# Patient Record
Sex: FEMALE | Race: WHITE | NOT HISPANIC OR LATINO | URBAN - METROPOLITAN AREA
[De-identification: names, ages, dates, MRNs, and addresses within clinical notes are randomized per-mention and may not be internally consistent; named-entity substitution may affect disease eponyms.]

---

## 2016-12-22 NOTE — H&P PST ADULT - ASSESSMENT
88yo female patient scheduled for surgery on 1/6/16. She has been seen by Cardiology and Vascular and will be obtaining medical clearance as well. She will hold Advil starting on 12/30. She will be NPO as per Anesthesia and will take Levothyroxine, Pepcid, Alprazolam, Losartan and Verapamil on AM of surgery with a sip of water. All other pre-op instructions reviewed with patient.   She will meet with Anesthesia and Pharmacy today.   She denies any metal allergies.   Pts height and weight are estimated - unable to stand up.   Pts family history is unknown- adopted. 88yo female patient scheduled for surgery on 1/6/16. She has been seen by Cardiology and Vascular and will be obtaining medical clearance as well. She will hold Advil starting on 12/30. She will be NPO as per Anesthesia and will take Levothyroxine, Pepcid, Alprazolam, Losartan and Verapamil on AM of surgery with a sip of water. All other pre-op instructions reviewed with patient.   She will meet with Anesthesia and Pharmacy today.   She denies any metal allergies.   Pts height and weight are as reported (from assisted living) - unable to stand up.   Pts family history is unknown- adopted.

## 2016-12-22 NOTE — H&P PST ADULT - HISTORY OF PRESENT ILLNESS
86yo female patient with approximately 5yr history of progressively worsening pain and swelling in her left knee, which became worse over the past year. She rates the pain at 0/10 with medication and at rest, but it can go as high as 8-9/10. It seems worst at night while in bed. She is taking Ibuprofen with relief. She is unable to stand or ambulate at this time. She was told by Ortho approx 5yrs ago that TKR is recommended. She is now scheduled for TKR and presents today for PSTs.

## 2016-12-22 NOTE — H&P PST ADULT - NSANTHOSAYNRD_GEN_A_CORE
No. KEIRA screening performed.  STOP BANG Legend: 0-2 = LOW Risk; 3-4 = INTERMEDIATE Risk; 5-8 = HIGH Risk

## 2016-12-22 NOTE — H&P PST ADULT - FUNCTIONAL LEVEL PRIOR: COMMUNICATION
h/o dementia- somewhat limited understanding/(2) difficulty understanding (not related to language barrier)

## 2016-12-22 NOTE — H&P PST ADULT - MUSCULOSKELETAL
details… detailed exam decreased ROM/decreased ROM due to pain/joint swelling/diminished strength/no calf tenderness/no joint erythema/LLE/no joint warmth

## 2016-12-22 NOTE — H&P PST ADULT - PMH
Deep vein thrombosis (DVT) of left lower extremity, unspecified chronicity, unspecified vein  2015  Dementia without behavioral disturbance, unspecified dementia type    Hyperlipidemia    Hypertension    Hypothyroidism    Osteoarthritis    Primary osteoarthritis of left knee

## 2017-01-05 NOTE — PROVIDER CONTACT NOTE (OTHER) - ASSESSMENT
NKDA. PMH: DVT 2015; dementia (donepezil 10mg QPM, alprazolam 0.25mg Q2pm, alprazolam 0.5mg BID 7-7, quetiapine 25mg Qday); HTN (losartan 50mg Qday, verapamil ER 240mg Qday); HLD (simvastatin 10mg HS); hypothyroid (levothyroxine 75mcg Qday); vascular occlusive disease. Patient also takes albuterol inhaler prn and Flovent 220 inhaler BID with no diagnosis listed in H&P. As of this moment, no surgical clearances have been received for this patient. Patient resides in Assisted Living facility.

## 2017-01-05 NOTE — PROVIDER CONTACT NOTE (OTHER) - RECOMMENDATIONS
1. TOPICAL TXA  2. High risk VTE postop – Eliquis 2.5mg BID x 12 days followed by ASA 325mg BID x 4 weeks.  3. Close monitoring for postop exacerbation of dementia

## 2017-01-06 ENCOUNTER — OUTPATIENT (OUTPATIENT)
Dept: OUTPATIENT SERVICES | Facility: HOSPITAL | Age: 82
LOS: 1 days | End: 2017-01-06
Payer: COMMERCIAL

## 2017-01-06 ENCOUNTER — APPOINTMENT (OUTPATIENT)
Dept: ORTHOPEDIC SURGERY | Facility: HOSPITAL | Age: 82
End: 2017-01-06

## 2017-01-06 DIAGNOSIS — Z98.42 CATARACT EXTRACTION STATUS, LEFT EYE: Chronic | ICD-10-CM

## 2017-01-13 ENCOUNTER — APPOINTMENT (OUTPATIENT)
Dept: ORTHOPEDIC SURGERY | Facility: HOSPITAL | Age: 82
End: 2017-01-13

## 2017-01-13 RX ORDER — ONDANSETRON 8 MG/1
4 TABLET, FILM COATED ORAL EVERY 6 HOURS
Qty: 0 | Refills: 0 | Status: DISCONTINUED | OUTPATIENT
Start: 2017-01-17 | End: 2017-01-19

## 2017-01-13 RX ORDER — ACETAMINOPHEN 500 MG
1000 TABLET ORAL ONCE
Qty: 0 | Refills: 0 | Status: DISCONTINUED | OUTPATIENT
Start: 2017-01-17 | End: 2017-01-19

## 2017-01-13 RX ORDER — DOCUSATE SODIUM 100 MG
100 CAPSULE ORAL THREE TIMES A DAY
Qty: 0 | Refills: 0 | Status: DISCONTINUED | OUTPATIENT
Start: 2017-01-17 | End: 2017-01-19

## 2017-01-13 RX ORDER — MAGNESIUM HYDROXIDE 400 MG/1
30 TABLET, CHEWABLE ORAL DAILY
Qty: 0 | Refills: 0 | Status: DISCONTINUED | OUTPATIENT
Start: 2017-01-17 | End: 2017-01-19

## 2017-01-13 RX ORDER — SENNA PLUS 8.6 MG/1
2 TABLET ORAL AT BEDTIME
Qty: 0 | Refills: 0 | Status: DISCONTINUED | OUTPATIENT
Start: 2017-01-17 | End: 2017-01-19

## 2017-01-13 RX ORDER — POLYETHYLENE GLYCOL 3350 17 G/17G
17 POWDER, FOR SOLUTION ORAL DAILY
Qty: 0 | Refills: 0 | Status: DISCONTINUED | OUTPATIENT
Start: 2017-01-17 | End: 2017-01-19

## 2017-01-16 ENCOUNTER — RESULT REVIEW (OUTPATIENT)
Age: 82
End: 2017-01-16

## 2017-01-17 ENCOUNTER — APPOINTMENT (OUTPATIENT)
Dept: ORTHOPEDIC SURGERY | Facility: HOSPITAL | Age: 82
End: 2017-01-17

## 2017-01-17 ENCOUNTER — INPATIENT (INPATIENT)
Facility: HOSPITAL | Age: 82
LOS: 1 days | Discharge: NURSING, REHAB OR LONG-TERM CARE | DRG: 470 | End: 2017-01-19
Attending: ORTHOPAEDIC SURGERY | Admitting: ORTHOPAEDIC SURGERY
Payer: COMMERCIAL

## 2017-01-17 VITALS
HEIGHT: 66 IN | TEMPERATURE: 208 F | RESPIRATION RATE: 13 BRPM | OXYGEN SATURATION: 96 % | DIASTOLIC BLOOD PRESSURE: 97 MMHG | SYSTOLIC BLOOD PRESSURE: 180 MMHG | HEART RATE: 79 BPM | WEIGHT: 175.05 LBS

## 2017-01-17 DIAGNOSIS — M19.90 UNSPECIFIED OSTEOARTHRITIS, UNSPECIFIED SITE: ICD-10-CM

## 2017-01-17 DIAGNOSIS — Z98.42 CATARACT EXTRACTION STATUS, LEFT EYE: Chronic | ICD-10-CM

## 2017-01-17 LAB
HCT VFR BLD CALC: 33.2 % — LOW (ref 34.5–45)
HGB BLD-MCNC: 11.4 G/DL — LOW (ref 11.5–15.5)
MCHC RBC-ENTMCNC: 31.5 PG — SIGNIFICANT CHANGE UP (ref 27–34)
MCHC RBC-ENTMCNC: 34.3 GM/DL — SIGNIFICANT CHANGE UP (ref 32–36)
MCV RBC AUTO: 91.9 FL — SIGNIFICANT CHANGE UP (ref 80–100)
PLATELET # BLD AUTO: 231 K/UL — SIGNIFICANT CHANGE UP (ref 150–400)
RBC # BLD: 3.61 M/UL — LOW (ref 3.8–5.2)
RBC # FLD: 12 % — SIGNIFICANT CHANGE UP (ref 10.3–14.5)
WBC # BLD: 15.6 K/UL — HIGH (ref 3.8–10.5)
WBC # FLD AUTO: 15.6 K/UL — HIGH (ref 3.8–10.5)

## 2017-01-17 PROCEDURE — 27447 TOTAL KNEE ARTHROPLASTY: CPT | Mod: LT

## 2017-01-17 PROCEDURE — 88311 DECALCIFY TISSUE: CPT | Mod: 26

## 2017-01-17 PROCEDURE — 73562 X-RAY EXAM OF KNEE 3: CPT | Mod: 26,LT

## 2017-01-17 PROCEDURE — 88305 TISSUE EXAM BY PATHOLOGIST: CPT | Mod: 26

## 2017-01-17 PROCEDURE — 99223 1ST HOSP IP/OBS HIGH 75: CPT

## 2017-01-17 RX ORDER — APIXABAN 2.5 MG/1
2.5 TABLET, FILM COATED ORAL
Qty: 0 | Refills: 0 | Status: COMPLETED | OUTPATIENT
Start: 2017-01-18 | End: 2017-01-18

## 2017-01-17 RX ORDER — PANTOPRAZOLE SODIUM 20 MG/1
40 TABLET, DELAYED RELEASE ORAL
Qty: 0 | Refills: 0 | Status: DISCONTINUED | OUTPATIENT
Start: 2017-01-17 | End: 2017-01-19

## 2017-01-17 RX ORDER — ACETAMINOPHEN 500 MG
1000 TABLET ORAL ONCE
Qty: 0 | Refills: 0 | Status: COMPLETED | OUTPATIENT
Start: 2017-01-17 | End: 2017-01-17

## 2017-01-17 RX ORDER — ACETAMINOPHEN 500 MG
1000 TABLET ORAL EVERY 8 HOURS
Qty: 0 | Refills: 0 | Status: DISCONTINUED | OUTPATIENT
Start: 2017-01-18 | End: 2017-01-19

## 2017-01-17 RX ORDER — QUETIAPINE FUMARATE 200 MG/1
25 TABLET, FILM COATED ORAL AT BEDTIME
Qty: 0 | Refills: 0 | Status: DISCONTINUED | OUTPATIENT
Start: 2017-01-17 | End: 2017-01-19

## 2017-01-17 RX ORDER — FLUTICASONE PROPIONATE 220 MCG
2 AEROSOL WITH ADAPTER (GRAM) INHALATION
Qty: 0 | Refills: 0 | Status: DISCONTINUED | OUTPATIENT
Start: 2017-01-17 | End: 2017-01-19

## 2017-01-17 RX ORDER — LOSARTAN POTASSIUM 100 MG/1
50 TABLET, FILM COATED ORAL DAILY
Qty: 0 | Refills: 0 | Status: DISCONTINUED | OUTPATIENT
Start: 2017-01-19 | End: 2017-01-19

## 2017-01-17 RX ORDER — LORATADINE 10 MG/1
10 TABLET ORAL DAILY
Qty: 0 | Refills: 0 | Status: DISCONTINUED | OUTPATIENT
Start: 2017-01-17 | End: 2017-01-19

## 2017-01-17 RX ORDER — LEVOTHYROXINE SODIUM 125 MCG
75 TABLET ORAL DAILY
Qty: 0 | Refills: 0 | Status: DISCONTINUED | OUTPATIENT
Start: 2017-01-17 | End: 2017-01-19

## 2017-01-17 RX ORDER — FLUTICASONE PROPIONATE 220 MCG
1 AEROSOL WITH ADAPTER (GRAM) INHALATION
Qty: 0 | Refills: 0 | Status: DISCONTINUED | OUTPATIENT
Start: 2017-01-17 | End: 2017-01-17

## 2017-01-17 RX ORDER — OXYCODONE HYDROCHLORIDE 5 MG/1
10 TABLET ORAL
Qty: 0 | Refills: 0 | Status: DISCONTINUED | OUTPATIENT
Start: 2017-01-17 | End: 2017-01-18

## 2017-01-17 RX ORDER — OXYCODONE HYDROCHLORIDE 5 MG/1
5 TABLET ORAL
Qty: 0 | Refills: 0 | Status: DISCONTINUED | OUTPATIENT
Start: 2017-01-17 | End: 2017-01-18

## 2017-01-17 RX ORDER — DONEPEZIL HYDROCHLORIDE 10 MG/1
10 TABLET, FILM COATED ORAL AT BEDTIME
Qty: 0 | Refills: 0 | Status: DISCONTINUED | OUTPATIENT
Start: 2017-01-17 | End: 2017-01-19

## 2017-01-17 RX ORDER — CEFAZOLIN SODIUM 1 G
2000 VIAL (EA) INJECTION ONCE
Qty: 0 | Refills: 0 | Status: COMPLETED | OUTPATIENT
Start: 2017-01-17 | End: 2017-01-17

## 2017-01-17 RX ORDER — SODIUM CHLORIDE 9 MG/ML
1000 INJECTION, SOLUTION INTRAVENOUS
Qty: 0 | Refills: 0 | Status: DISCONTINUED | OUTPATIENT
Start: 2017-01-17 | End: 2017-01-17

## 2017-01-17 RX ORDER — SODIUM CHLORIDE 9 MG/ML
1000 INJECTION, SOLUTION INTRAVENOUS
Qty: 0 | Refills: 0 | Status: DISCONTINUED | OUTPATIENT
Start: 2017-01-17 | End: 2017-01-19

## 2017-01-17 RX ORDER — CEFAZOLIN SODIUM 1 G
2000 VIAL (EA) INJECTION EVERY 8 HOURS
Qty: 0 | Refills: 0 | Status: COMPLETED | OUTPATIENT
Start: 2017-01-17 | End: 2017-01-18

## 2017-01-17 RX ORDER — HYDROMORPHONE HYDROCHLORIDE 2 MG/ML
0.5 INJECTION INTRAMUSCULAR; INTRAVENOUS; SUBCUTANEOUS
Qty: 0 | Refills: 0 | Status: DISCONTINUED | OUTPATIENT
Start: 2017-01-17 | End: 2017-01-17

## 2017-01-17 RX ORDER — ACETAMINOPHEN 500 MG
1000 TABLET ORAL EVERY 6 HOURS
Qty: 0 | Refills: 0 | Status: COMPLETED | OUTPATIENT
Start: 2017-01-17 | End: 2017-01-18

## 2017-01-17 RX ORDER — ALBUTEROL 90 UG/1
2 AEROSOL, METERED ORAL
Qty: 0 | Refills: 0 | Status: DISCONTINUED | OUTPATIENT
Start: 2017-01-17 | End: 2017-01-19

## 2017-01-17 RX ORDER — ALPRAZOLAM 0.25 MG
0.25 TABLET ORAL
Qty: 0 | Refills: 0 | Status: DISCONTINUED | OUTPATIENT
Start: 2017-01-17 | End: 2017-01-18

## 2017-01-17 RX ORDER — HYDROMORPHONE HYDROCHLORIDE 2 MG/ML
0.5 INJECTION INTRAMUSCULAR; INTRAVENOUS; SUBCUTANEOUS
Qty: 0 | Refills: 0 | Status: DISCONTINUED | OUTPATIENT
Start: 2017-01-17 | End: 2017-01-19

## 2017-01-17 RX ORDER — SIMVASTATIN 20 MG/1
10 TABLET, FILM COATED ORAL AT BEDTIME
Qty: 0 | Refills: 0 | Status: DISCONTINUED | OUTPATIENT
Start: 2017-01-17 | End: 2017-01-19

## 2017-01-17 RX ORDER — VERAPAMIL HCL 240 MG
240 CAPSULE, EXTENDED RELEASE PELLETS 24 HR ORAL DAILY
Qty: 0 | Refills: 0 | Status: DISCONTINUED | OUTPATIENT
Start: 2017-01-17 | End: 2017-01-19

## 2017-01-17 RX ORDER — APIXABAN 2.5 MG/1
2.5 TABLET, FILM COATED ORAL EVERY 12 HOURS
Qty: 0 | Refills: 0 | Status: DISCONTINUED | OUTPATIENT
Start: 2017-01-19 | End: 2017-01-19

## 2017-01-17 RX ADMIN — Medication 400 MILLIGRAM(S): at 21:34

## 2017-01-17 RX ADMIN — SODIUM CHLORIDE 50 MILLILITER(S): 9 INJECTION, SOLUTION INTRAVENOUS at 15:50

## 2017-01-17 RX ADMIN — OXYCODONE HYDROCHLORIDE 10 MILLIGRAM(S): 5 TABLET ORAL at 21:45

## 2017-01-17 RX ADMIN — QUETIAPINE FUMARATE 25 MILLIGRAM(S): 200 TABLET, FILM COATED ORAL at 21:11

## 2017-01-17 RX ADMIN — Medication 1000 MILLIGRAM(S): at 21:38

## 2017-01-17 RX ADMIN — OXYCODONE HYDROCHLORIDE 10 MILLIGRAM(S): 5 TABLET ORAL at 21:11

## 2017-01-17 RX ADMIN — DONEPEZIL HYDROCHLORIDE 10 MILLIGRAM(S): 10 TABLET, FILM COATED ORAL at 21:11

## 2017-01-17 RX ADMIN — HYDROMORPHONE HYDROCHLORIDE 0.5 MILLIGRAM(S): 2 INJECTION INTRAMUSCULAR; INTRAVENOUS; SUBCUTANEOUS at 17:53

## 2017-01-17 RX ADMIN — SIMVASTATIN 10 MILLIGRAM(S): 20 TABLET, FILM COATED ORAL at 21:11

## 2017-01-17 RX ADMIN — HYDROMORPHONE HYDROCHLORIDE 0.5 MILLIGRAM(S): 2 INJECTION INTRAMUSCULAR; INTRAVENOUS; SUBCUTANEOUS at 17:04

## 2017-01-17 RX ADMIN — HYDROMORPHONE HYDROCHLORIDE 0.5 MILLIGRAM(S): 2 INJECTION INTRAMUSCULAR; INTRAVENOUS; SUBCUTANEOUS at 16:34

## 2017-01-17 RX ADMIN — Medication 100 MILLIGRAM(S): at 20:31

## 2017-01-17 RX ADMIN — HYDROMORPHONE HYDROCHLORIDE 0.5 MILLIGRAM(S): 2 INJECTION INTRAMUSCULAR; INTRAVENOUS; SUBCUTANEOUS at 17:23

## 2017-01-17 NOTE — PHYSICAL THERAPY INITIAL EVALUATION ADULT - GENERAL OBSERVATIONS, REHAB EVAL
pt resting in pt I with crutches for safe dc home today PWB. Pt I with stairs. RN aware of pt statusu with NACHO toro, hemkwasic pt resting in pacu. pt confused and needed multiple vc for participation. RN aware. pt with with toro, IV, hemovac.

## 2017-01-18 LAB
ANION GAP SERPL CALC-SCNC: 5 MMOL/L — SIGNIFICANT CHANGE UP (ref 5–17)
BUN SERPL-MCNC: 14 MG/DL — SIGNIFICANT CHANGE UP (ref 7–23)
CALCIUM SERPL-MCNC: 8.6 MG/DL — SIGNIFICANT CHANGE UP (ref 8.4–10.5)
CHLORIDE SERPL-SCNC: 106 MMOL/L — SIGNIFICANT CHANGE UP (ref 96–108)
CO2 SERPL-SCNC: 30 MMOL/L — SIGNIFICANT CHANGE UP (ref 22–31)
CREAT SERPL-MCNC: 0.84 MG/DL — SIGNIFICANT CHANGE UP (ref 0.5–1.3)
GLUCOSE SERPL-MCNC: 107 MG/DL — HIGH (ref 70–99)
HCT VFR BLD CALC: 30.8 % — LOW (ref 34.5–45)
HGB BLD-MCNC: 10.2 G/DL — LOW (ref 11.5–15.5)
MCHC RBC-ENTMCNC: 31.3 PG — SIGNIFICANT CHANGE UP (ref 27–34)
MCHC RBC-ENTMCNC: 33.2 GM/DL — SIGNIFICANT CHANGE UP (ref 32–36)
MCV RBC AUTO: 94.4 FL — SIGNIFICANT CHANGE UP (ref 80–100)
PLATELET # BLD AUTO: 216 K/UL — SIGNIFICANT CHANGE UP (ref 150–400)
POTASSIUM SERPL-MCNC: 4.1 MMOL/L — SIGNIFICANT CHANGE UP (ref 3.5–5.3)
POTASSIUM SERPL-SCNC: 4.1 MMOL/L — SIGNIFICANT CHANGE UP (ref 3.5–5.3)
RBC # BLD: 3.26 M/UL — LOW (ref 3.8–5.2)
RBC # FLD: 11.7 % — SIGNIFICANT CHANGE UP (ref 10.3–14.5)
SODIUM SERPL-SCNC: 141 MMOL/L — SIGNIFICANT CHANGE UP (ref 135–145)
WBC # BLD: 7.1 K/UL — SIGNIFICANT CHANGE UP (ref 3.8–10.5)
WBC # FLD AUTO: 7.1 K/UL — SIGNIFICANT CHANGE UP (ref 3.8–10.5)

## 2017-01-18 PROCEDURE — 99232 SBSQ HOSP IP/OBS MODERATE 35: CPT

## 2017-01-18 RX ORDER — ALPRAZOLAM 0.25 MG
0.25 TABLET ORAL THREE TIMES A DAY
Qty: 0 | Refills: 0 | Status: DISCONTINUED | OUTPATIENT
Start: 2017-01-18 | End: 2017-01-19

## 2017-01-18 RX ORDER — TRAMADOL HYDROCHLORIDE 50 MG/1
50 TABLET ORAL EVERY 4 HOURS
Qty: 0 | Refills: 0 | Status: DISCONTINUED | OUTPATIENT
Start: 2017-01-18 | End: 2017-01-19

## 2017-01-18 RX ORDER — TRAMADOL HYDROCHLORIDE 50 MG/1
100 TABLET ORAL EVERY 4 HOURS
Qty: 0 | Refills: 0 | Status: DISCONTINUED | OUTPATIENT
Start: 2017-01-18 | End: 2017-01-19

## 2017-01-18 RX ADMIN — Medication 1000 MILLIGRAM(S): at 15:30

## 2017-01-18 RX ADMIN — PANTOPRAZOLE SODIUM 40 MILLIGRAM(S): 20 TABLET, DELAYED RELEASE ORAL at 05:35

## 2017-01-18 RX ADMIN — TRAMADOL HYDROCHLORIDE 50 MILLIGRAM(S): 50 TABLET ORAL at 13:20

## 2017-01-18 RX ADMIN — Medication 100 MILLIGRAM(S): at 05:34

## 2017-01-18 RX ADMIN — Medication 0.25 MILLIGRAM(S): at 17:43

## 2017-01-18 RX ADMIN — SENNA PLUS 2 TABLET(S): 8.6 TABLET ORAL at 21:50

## 2017-01-18 RX ADMIN — APIXABAN 2.5 MILLIGRAM(S): 2.5 TABLET, FILM COATED ORAL at 12:28

## 2017-01-18 RX ADMIN — OXYCODONE HYDROCHLORIDE 10 MILLIGRAM(S): 5 TABLET ORAL at 05:34

## 2017-01-18 RX ADMIN — Medication 75 MICROGRAM(S): at 05:34

## 2017-01-18 RX ADMIN — APIXABAN 2.5 MILLIGRAM(S): 2.5 TABLET, FILM COATED ORAL at 19:23

## 2017-01-18 RX ADMIN — Medication 240 MILLIGRAM(S): at 05:34

## 2017-01-18 RX ADMIN — Medication 0.25 MILLIGRAM(S): at 10:03

## 2017-01-18 RX ADMIN — Medication 400 MILLIGRAM(S): at 09:08

## 2017-01-18 RX ADMIN — Medication 2 PUFF(S): at 05:36

## 2017-01-18 RX ADMIN — Medication 100 MILLIGRAM(S): at 21:50

## 2017-01-18 RX ADMIN — SIMVASTATIN 10 MILLIGRAM(S): 20 TABLET, FILM COATED ORAL at 21:50

## 2017-01-18 RX ADMIN — Medication 1000 MILLIGRAM(S): at 04:34

## 2017-01-18 RX ADMIN — Medication 400 MILLIGRAM(S): at 03:36

## 2017-01-18 RX ADMIN — TRAMADOL HYDROCHLORIDE 50 MILLIGRAM(S): 50 TABLET ORAL at 22:30

## 2017-01-18 RX ADMIN — Medication 1000 MILLIGRAM(S): at 14:59

## 2017-01-18 RX ADMIN — Medication 100 MILLIGRAM(S): at 14:59

## 2017-01-18 RX ADMIN — TRAMADOL HYDROCHLORIDE 50 MILLIGRAM(S): 50 TABLET ORAL at 21:50

## 2017-01-18 RX ADMIN — DONEPEZIL HYDROCHLORIDE 10 MILLIGRAM(S): 10 TABLET, FILM COATED ORAL at 21:50

## 2017-01-18 RX ADMIN — ALBUTEROL 2 PUFF(S): 90 AEROSOL, METERED ORAL at 18:45

## 2017-01-18 RX ADMIN — Medication 1000 MILLIGRAM(S): at 09:30

## 2017-01-18 RX ADMIN — OXYCODONE HYDROCHLORIDE 10 MILLIGRAM(S): 5 TABLET ORAL at 06:07

## 2017-01-18 RX ADMIN — TRAMADOL HYDROCHLORIDE 50 MILLIGRAM(S): 50 TABLET ORAL at 12:28

## 2017-01-18 RX ADMIN — LORATADINE 10 MILLIGRAM(S): 10 TABLET ORAL at 12:28

## 2017-01-18 RX ADMIN — Medication 100 MILLIGRAM(S): at 04:35

## 2017-01-18 RX ADMIN — Medication 2 PUFF(S): at 19:23

## 2017-01-18 RX ADMIN — QUETIAPINE FUMARATE 25 MILLIGRAM(S): 200 TABLET, FILM COATED ORAL at 21:50

## 2017-01-18 NOTE — OCCUPATIONAL THERAPY INITIAL EVALUATION ADULT - ADL RETRAINING, OT EVAL
Patient will dress lower body with minimal assistance, AE as needed within 3-5 sessions Patient will dress lower body with max assistance, AE as needed within 3-5 sessions

## 2017-01-18 NOTE — OCCUPATIONAL THERAPY INITIAL EVALUATION ADULT - ADDITIONAL COMMENTS
OT spoke with pts nephew re pts level PTA. Nephew reports she lived in assisted living with 24hr assistance & is non-ambulatory x 1 year (SPT performed with assist from bed<> w/c <> chair etc). Nephew reports pt with inc Left knee pain & TKR postponed x 1 year due to medical issues. Nephew states pt also needs Right TKR (if Left TKR successful). + w/c

## 2017-01-18 NOTE — DIETITIAN INITIAL EVALUATION ADULT. - OTHER INFO
Pt s/p TKR Pmhx: OA, dementia, HTN, HLD, hypothyroidism. Pt with dementia but able to make needs known. States she doesn't like cold wraps/sandwiches-prefers hot food. Pt states she has good appetite-no report of trouble chewing/swallowing or GI issues. Unable to provide education 2/2 cognition. RN confirms pt eating without trouble.

## 2017-01-18 NOTE — OCCUPATIONAL THERAPY INITIAL EVALUATION ADULT - IMPAIRED TRANSFERS: SIT/STAND, REHAB EVAL
pain/decreased strength/decreased flexibility decreased strength/+ side steps with RW, assist x 2 dec upright posture & 3rd person at times for Jak LE mvmt/decreased flexibility decreased strength/pt stood 3 x with  RW, assist x 2 dec upright posture. stood 3-5 seconds each time/decreased flexibility

## 2017-01-18 NOTE — OCCUPATIONAL THERAPY INITIAL EVALUATION ADULT - LEVEL OF INDEPENDENCE: SIT/STAND, REHAB EVAL
moderate assist (50% patients effort) maximum assist (25% patients effort)/moderate assist (50% patients effort)

## 2017-01-19 ENCOUNTER — TRANSCRIPTION ENCOUNTER (OUTPATIENT)
Age: 82
End: 2017-01-19

## 2017-01-19 VITALS
SYSTOLIC BLOOD PRESSURE: 122 MMHG | HEART RATE: 79 BPM | DIASTOLIC BLOOD PRESSURE: 73 MMHG | TEMPERATURE: 98 F | OXYGEN SATURATION: 98 % | RESPIRATION RATE: 18 BRPM

## 2017-01-19 LAB
ANION GAP SERPL CALC-SCNC: 7 MMOL/L — SIGNIFICANT CHANGE UP (ref 5–17)
BUN SERPL-MCNC: 13 MG/DL — SIGNIFICANT CHANGE UP (ref 7–23)
CALCIUM SERPL-MCNC: 8.7 MG/DL — SIGNIFICANT CHANGE UP (ref 8.4–10.5)
CHLORIDE SERPL-SCNC: 106 MMOL/L — SIGNIFICANT CHANGE UP (ref 96–108)
CO2 SERPL-SCNC: 27 MMOL/L — SIGNIFICANT CHANGE UP (ref 22–31)
CREAT SERPL-MCNC: 0.74 MG/DL — SIGNIFICANT CHANGE UP (ref 0.5–1.3)
FOLATE SERPL-MCNC: >20 NG/ML — SIGNIFICANT CHANGE UP (ref 4.8–24.2)
GLUCOSE SERPL-MCNC: 106 MG/DL — HIGH (ref 70–99)
MAGNESIUM SERPL-MCNC: 1.9 MG/DL — SIGNIFICANT CHANGE UP (ref 1.6–2.6)
POTASSIUM SERPL-MCNC: 3.8 MMOL/L — SIGNIFICANT CHANGE UP (ref 3.5–5.3)
POTASSIUM SERPL-SCNC: 3.8 MMOL/L — SIGNIFICANT CHANGE UP (ref 3.5–5.3)
SODIUM SERPL-SCNC: 140 MMOL/L — SIGNIFICANT CHANGE UP (ref 135–145)
TSH SERPL-MCNC: 4.57 UIU/ML — HIGH (ref 0.27–4.2)
VIT B12 SERPL-MCNC: 1031 PG/ML — HIGH (ref 243–894)

## 2017-01-19 PROCEDURE — 86850 RBC ANTIBODY SCREEN: CPT

## 2017-01-19 PROCEDURE — 97161 PT EVAL LOW COMPLEX 20 MIN: CPT

## 2017-01-19 PROCEDURE — 83735 ASSAY OF MAGNESIUM: CPT

## 2017-01-19 PROCEDURE — C1776: CPT

## 2017-01-19 PROCEDURE — 73562 X-RAY EXAM OF KNEE 3: CPT

## 2017-01-19 PROCEDURE — 97166 OT EVAL MOD COMPLEX 45 MIN: CPT

## 2017-01-19 PROCEDURE — C1889: CPT

## 2017-01-19 PROCEDURE — 86901 BLOOD TYPING SEROLOGIC RH(D): CPT

## 2017-01-19 PROCEDURE — 82607 VITAMIN B-12: CPT

## 2017-01-19 PROCEDURE — 97110 THERAPEUTIC EXERCISES: CPT

## 2017-01-19 PROCEDURE — 88305 TISSUE EXAM BY PATHOLOGIST: CPT

## 2017-01-19 PROCEDURE — 86900 BLOOD TYPING SEROLOGIC ABO: CPT

## 2017-01-19 PROCEDURE — 84443 ASSAY THYROID STIM HORMONE: CPT

## 2017-01-19 PROCEDURE — 94640 AIRWAY INHALATION TREATMENT: CPT

## 2017-01-19 PROCEDURE — 80048 BASIC METABOLIC PNL TOTAL CA: CPT

## 2017-01-19 PROCEDURE — 97530 THERAPEUTIC ACTIVITIES: CPT

## 2017-01-19 PROCEDURE — 85027 COMPLETE CBC AUTOMATED: CPT

## 2017-01-19 PROCEDURE — 97003: CPT

## 2017-01-19 PROCEDURE — 82746 ASSAY OF FOLIC ACID SERUM: CPT

## 2017-01-19 PROCEDURE — C1713: CPT

## 2017-01-19 PROCEDURE — 88311 DECALCIFY TISSUE: CPT

## 2017-01-19 PROCEDURE — 97001: CPT

## 2017-01-19 PROCEDURE — 99232 SBSQ HOSP IP/OBS MODERATE 35: CPT

## 2017-01-19 RX ORDER — ACETAMINOPHEN 500 MG
2 TABLET ORAL
Qty: 0 | Refills: 0 | COMMUNITY
Start: 2017-01-19

## 2017-01-19 RX ORDER — DOCUSATE SODIUM 100 MG
1 CAPSULE ORAL
Qty: 0 | Refills: 0 | DISCHARGE
Start: 2017-01-19

## 2017-01-19 RX ORDER — TRAMADOL HYDROCHLORIDE 50 MG/1
1 TABLET ORAL
Qty: 0 | Refills: 0 | COMMUNITY
Start: 2017-01-19

## 2017-01-19 RX ORDER — TRAMADOL HYDROCHLORIDE 50 MG/1
2 TABLET ORAL
Qty: 0 | Refills: 0 | COMMUNITY
Start: 2017-01-19

## 2017-01-19 RX ORDER — PANTOPRAZOLE SODIUM 20 MG/1
1 TABLET, DELAYED RELEASE ORAL
Qty: 0 | Refills: 0 | COMMUNITY
Start: 2017-01-19

## 2017-01-19 RX ORDER — DOCUSATE SODIUM 100 MG
1 CAPSULE ORAL
Qty: 0 | Refills: 0 | COMMUNITY
Start: 2017-01-19

## 2017-01-19 RX ORDER — SENNA PLUS 8.6 MG/1
2 TABLET ORAL
Qty: 0 | Refills: 0 | COMMUNITY
Start: 2017-01-19

## 2017-01-19 RX ORDER — APIXABAN 2.5 MG/1
1 TABLET, FILM COATED ORAL
Qty: 0 | Refills: 0 | COMMUNITY
Start: 2017-01-19

## 2017-01-19 RX ADMIN — TRAMADOL HYDROCHLORIDE 100 MILLIGRAM(S): 50 TABLET ORAL at 18:09

## 2017-01-19 RX ADMIN — Medication 100 MILLIGRAM(S): at 06:02

## 2017-01-19 RX ADMIN — TRAMADOL HYDROCHLORIDE 100 MILLIGRAM(S): 50 TABLET ORAL at 10:17

## 2017-01-19 RX ADMIN — Medication 1000 MILLIGRAM(S): at 08:45

## 2017-01-19 RX ADMIN — Medication 2 PUFF(S): at 06:49

## 2017-01-19 RX ADMIN — PANTOPRAZOLE SODIUM 40 MILLIGRAM(S): 20 TABLET, DELAYED RELEASE ORAL at 06:02

## 2017-01-19 RX ADMIN — LORATADINE 10 MILLIGRAM(S): 10 TABLET ORAL at 13:58

## 2017-01-19 RX ADMIN — Medication 0.25 MILLIGRAM(S): at 09:29

## 2017-01-19 RX ADMIN — LOSARTAN POTASSIUM 50 MILLIGRAM(S): 100 TABLET, FILM COATED ORAL at 06:02

## 2017-01-19 RX ADMIN — Medication 100 MILLIGRAM(S): at 13:58

## 2017-01-19 RX ADMIN — TRAMADOL HYDROCHLORIDE 100 MILLIGRAM(S): 50 TABLET ORAL at 06:32

## 2017-01-19 RX ADMIN — Medication 0.25 MILLIGRAM(S): at 17:17

## 2017-01-19 RX ADMIN — Medication 1000 MILLIGRAM(S): at 16:30

## 2017-01-19 RX ADMIN — Medication 75 MICROGRAM(S): at 06:02

## 2017-01-19 RX ADMIN — Medication 1000 MILLIGRAM(S): at 08:18

## 2017-01-19 RX ADMIN — Medication 2 PUFF(S): at 18:06

## 2017-01-19 RX ADMIN — Medication 240 MILLIGRAM(S): at 06:02

## 2017-01-19 RX ADMIN — TRAMADOL HYDROCHLORIDE 100 MILLIGRAM(S): 50 TABLET ORAL at 06:02

## 2017-01-19 RX ADMIN — TRAMADOL HYDROCHLORIDE 100 MILLIGRAM(S): 50 TABLET ORAL at 17:18

## 2017-01-19 RX ADMIN — Medication 1000 MILLIGRAM(S): at 15:48

## 2017-01-19 RX ADMIN — APIXABAN 2.5 MILLIGRAM(S): 2.5 TABLET, FILM COATED ORAL at 08:18

## 2017-01-19 RX ADMIN — TRAMADOL HYDROCHLORIDE 100 MILLIGRAM(S): 50 TABLET ORAL at 11:10

## 2017-01-19 NOTE — DISCHARGE NOTE ADULT - MEDICATION SUMMARY - MEDICATIONS TO STOP TAKING
I will STOP taking the medications listed below when I get home from the hospital:    ibuprofen 200 mg oral tablet  -- 2 tab(s) by mouth once a day  -- AM    lactulose 10 g oral powder for reconstitution  -- 1 dose(s) by mouth 2 times a day

## 2017-01-19 NOTE — DISCHARGE NOTE ADULT - CARE PROVIDER_API CALL
Chong Toribio), Orthopaedic Surgery; Sports Medicine  [address] [place] [address]  Phone: [phone]  Fax: [phone]

## 2017-01-19 NOTE — DISCHARGE NOTE ADULT - MEDICATION SUMMARY - MEDICATIONS TO TAKE
I will START or STAY ON the medications listed below when I get home from the hospital:    acetaminophen 500 mg oral tablet  -- 2 tab(s) by mouth every 12 hours  -- Indication: For Pain    traMADol 50 mg oral tablet  -- 2 tab(s) by mouth every 4 hours, As needed, Moderate Pain  -- Indication: For Pain    traMADol 50 mg oral tablet  -- 1 tab(s) by mouth every 4 hours, As needed, Mild Pain  -- Indication: For Pain    losartan 50 mg oral tablet  -- 1 tab(s) by mouth once a day  -- Indication: For HTN    verapamil 240 mg/24 hours oral capsule, extended release  -- 1 cap(s) by mouth once a day  -- Indication: For HTN    apixaban 2.5 mg oral tablet  -- 1 tab(s) by mouth every 12 hours stop on 1/29/2017  -- Indication: For DVT prophylaxis    loratadine 10 mg oral tablet  -- 1 tab(s) by mouth once a day  -- Indication: For allergies    simvastatin 10 mg oral tablet  -- 1 tab(s) by mouth once a day (at bedtime)  -- Indication: For HLD    QUEtiapine 25 mg oral tablet  -- 1 tab(s) by mouth once a day  -- 12noon  -- Indication: For antipsychotic     ALPRAZolam  -- 0.25 milligram(s) by mouth once a day  -- 2p  -- Indication: For anxiety    ALPRAZolam 0.5 mg oral tablet  -- 1 tab(s) by mouth every 12 hours  -- Indication: For anxiety    ProAir HFA 90 mcg/inh inhalation aerosol  -- 2 puff(s) inhaled 2 times a day  -- Indication: For asthma    donepezil 10 mg oral tablet  -- 1 tab(s) by mouth once a day  -- Indication: For dementia    senna oral tablet  -- 2 tab(s) by mouth once a day (at bedtime), As Needed  -- Indication: For constipation    docusate sodium 100 mg oral capsule  -- 1 cap(s) by mouth 3 times a day, As Needed  -- Indication: For constipation    pantoprazole 40 mg oral delayed release tablet  -- 1 tab(s) by mouth once a day (before a meal) stop on 2/28/2017  -- Indication: For gerd    Flovent  mcg/inh inhalation aerosol  -- 1 patch inhaled 2 times a day  -- Indication: For asthma    levothyroxine 75 mcg (0.075 mg) oral tablet  -- 1 tab(s) by mouth once a day  -- Indication: For hypothyrodism    Oyster Shell Calcium with Vitamin D 500 mg-200 intl units oral tablet  -- 1 tab(s) by mouth 2 times a day  -- Indication: For vit    StressTabs oral tablet  -- 1 tab(s) by mouth once a day  -- Indication: For vit    cholecalciferol 1000 intl units oral tablet  -- 1 tab(s) by mouth once a day  -- Indication: For vit

## 2017-01-19 NOTE — DISCHARGE NOTE ADULT - HOSPITAL COURSE
This patient was admitted to [facility] with a history of severe degenerative joint disease of the left knee.  Patient went to Pre-Surgical Testing at [facility] and was medically cleared to undergo elective procedure.  No operative or debora-operative complications arose during patients hospital course.  Patient received antibiotic according to SCIP guidelines for infection prevention.  Eliquis was given for DVT prophylaxis.  Anesthesia, Medical Hospitalist, Physical Therapy and Occupational Therapy were consulted. Patient is stable for discharge with a good prognosis.  Appropriate discharge instructions and medications are provided in this document.

## 2017-01-19 NOTE — DISCHARGE NOTE ADULT - CARE PROVIDERS DIRECT ADDRESSES
[email or web address]

## 2017-01-19 NOTE — DISCHARGE NOTE ADULT - PATIENT PORTAL LINK FT
“You can access the Follow[employer] Patient Portal, offered by [facility], by registering with the following website: [email or web address]”

## 2017-01-19 NOTE — DISCHARGE NOTE ADULT - PLAN OF CARE
to improve pain and quality of life Physical Therapy/Occupational Therapy for ambulation, transfers, stairs, ADL's, Range of Motion Excercises, Isometrics.  Full weight bearing as tolerated with rolling walker  Range of Motion Goals: Flexion 120 degrees; Extension 0 degrees  Keep incision clean and dry.  Suture/prineo dressing removal 14 days after surgery at rehab facility or Surgeon's office  May shower post-op day #5 if no drainage from incision

## 2017-01-19 NOTE — DISCHARGE NOTE ADULT - NS AS ACTIVITY OBS
Walking-Outdoors allowed/Do not drive or operate machinery/No Heavy lifting/straining/Do not make important decisions/Walking-Indoors allowed

## 2017-01-19 NOTE — DISCHARGE NOTE ADULT - CARE PLAN
Principal Discharge DX:	Primary osteoarthritis of left knee  Goal:	to improve pain and quality of life  Instructions for follow-up, activity and diet:	Physical Therapy/Occupational Therapy for ambulation, transfers, stairs, ADL's, Range of Motion Excercises, Isometrics.  Full weight bearing as tolerated with rolling walker  Range of Motion Goals: Flexion 120 degrees; Extension 0 degrees  Keep incision clean and dry.  Suture/prineo dressing removal 14 days after surgery at rehab facility or Surgeon's office  May shower post-op day #5 if no drainage from incision

## 2017-01-24 DIAGNOSIS — M19.90 UNSPECIFIED OSTEOARTHRITIS, UNSPECIFIED SITE: ICD-10-CM

## 2017-01-26 PROCEDURE — 73562 X-RAY EXAM OF KNEE 3: CPT

## 2019-03-22 ENCOUNTER — INPATIENT (INPATIENT)
Facility: HOSPITAL | Age: 84
LOS: 4 days | Discharge: HOME HEALTH | End: 2019-03-27
Attending: INTERNAL MEDICINE | Admitting: INTERNAL MEDICINE
Payer: MEDICARE

## 2019-03-22 VITALS
HEIGHT: 66 IN | TEMPERATURE: 98 F | DIASTOLIC BLOOD PRESSURE: 94 MMHG | OXYGEN SATURATION: 92 % | RESPIRATION RATE: 25 BRPM | WEIGHT: 169.98 LBS | HEART RATE: 71 BPM | SYSTOLIC BLOOD PRESSURE: 155 MMHG

## 2019-03-22 DIAGNOSIS — Z98.42 CATARACT EXTRACTION STATUS, LEFT EYE: Chronic | ICD-10-CM

## 2019-03-22 LAB
ALBUMIN SERPL ELPH-MCNC: 2.6 G/DL — LOW (ref 3.3–5)
ALP SERPL-CCNC: 121 U/L — HIGH (ref 40–120)
ALT FLD-CCNC: 51 U/L — SIGNIFICANT CHANGE UP (ref 12–78)
ANION GAP SERPL CALC-SCNC: 8 MMOL/L — SIGNIFICANT CHANGE UP (ref 5–17)
APPEARANCE UR: CLEAR — SIGNIFICANT CHANGE UP
APTT BLD: 31.5 SEC — SIGNIFICANT CHANGE UP (ref 27.5–36.3)
AST SERPL-CCNC: 55 U/L — HIGH (ref 15–37)
BACTERIA # UR AUTO: ABNORMAL
BILIRUB SERPL-MCNC: 0.5 MG/DL — SIGNIFICANT CHANGE UP (ref 0.2–1.2)
BILIRUB UR-MCNC: NEGATIVE — SIGNIFICANT CHANGE UP
BLD GP AB SCN SERPL QL: SIGNIFICANT CHANGE UP
BUN SERPL-MCNC: 25 MG/DL — HIGH (ref 7–23)
CALCIUM SERPL-MCNC: 9.6 MG/DL — SIGNIFICANT CHANGE UP (ref 8.5–10.1)
CHLORIDE SERPL-SCNC: 110 MMOL/L — HIGH (ref 96–108)
CO2 SERPL-SCNC: 29 MMOL/L — SIGNIFICANT CHANGE UP (ref 22–31)
COLOR SPEC: YELLOW — SIGNIFICANT CHANGE UP
COMMENT - URINE: SIGNIFICANT CHANGE UP
CREAT SERPL-MCNC: 0.91 MG/DL — SIGNIFICANT CHANGE UP (ref 0.5–1.3)
DIFF PNL FLD: NEGATIVE — SIGNIFICANT CHANGE UP
EPI CELLS # UR: SIGNIFICANT CHANGE UP
GLUCOSE SERPL-MCNC: 100 MG/DL — HIGH (ref 70–99)
GLUCOSE UR QL: NEGATIVE MG/DL — SIGNIFICANT CHANGE UP
HCT VFR BLD CALC: 40.7 % — SIGNIFICANT CHANGE UP (ref 34.5–45)
HGB BLD-MCNC: 13.1 G/DL — SIGNIFICANT CHANGE UP (ref 11.5–15.5)
INR BLD: 1.17 RATIO — HIGH (ref 0.88–1.16)
KETONES UR-MCNC: NEGATIVE — SIGNIFICANT CHANGE UP
LACTATE SERPL-SCNC: 1 MMOL/L — SIGNIFICANT CHANGE UP (ref 0.7–2)
LEUKOCYTE ESTERASE UR-ACNC: NEGATIVE — SIGNIFICANT CHANGE UP
MCHC RBC-ENTMCNC: 31.9 PG — SIGNIFICANT CHANGE UP (ref 27–34)
MCHC RBC-ENTMCNC: 32.2 GM/DL — SIGNIFICANT CHANGE UP (ref 32–36)
MCV RBC AUTO: 99 FL — SIGNIFICANT CHANGE UP (ref 80–100)
NITRITE UR-MCNC: NEGATIVE — SIGNIFICANT CHANGE UP
NRBC # BLD: 0 /100 WBCS — SIGNIFICANT CHANGE UP (ref 0–0)
NT-PROBNP SERPL-SCNC: 251 PG/ML — SIGNIFICANT CHANGE UP (ref 0–450)
PH UR: 5 — SIGNIFICANT CHANGE UP (ref 5–8)
PLATELET # BLD AUTO: 209 K/UL — SIGNIFICANT CHANGE UP (ref 150–400)
POTASSIUM SERPL-MCNC: 3.5 MMOL/L — SIGNIFICANT CHANGE UP (ref 3.5–5.3)
POTASSIUM SERPL-SCNC: 3.5 MMOL/L — SIGNIFICANT CHANGE UP (ref 3.5–5.3)
PROT SERPL-MCNC: 7.3 GM/DL — SIGNIFICANT CHANGE UP (ref 6–8.3)
PROT UR-MCNC: 15 MG/DL
PROTHROM AB SERPL-ACNC: 13.1 SEC — HIGH (ref 10–12.9)
RAPID RVP RESULT: SIGNIFICANT CHANGE UP
RBC # BLD: 4.11 M/UL — SIGNIFICANT CHANGE UP (ref 3.8–5.2)
RBC # FLD: 12.9 % — SIGNIFICANT CHANGE UP (ref 10.3–14.5)
RBC CASTS # UR COMP ASSIST: SIGNIFICANT CHANGE UP /HPF (ref 0–4)
SODIUM SERPL-SCNC: 147 MMOL/L — HIGH (ref 135–145)
SP GR SPEC: 1.02 — SIGNIFICANT CHANGE UP (ref 1.01–1.02)
TROPONIN I SERPL-MCNC: <0.015 NG/ML — SIGNIFICANT CHANGE UP (ref 0.01–0.04)
TYPE + AB SCN PNL BLD: SIGNIFICANT CHANGE UP
UROBILINOGEN FLD QL: NEGATIVE MG/DL — SIGNIFICANT CHANGE UP
WBC # BLD: 7.93 K/UL — SIGNIFICANT CHANGE UP (ref 3.8–10.5)
WBC # FLD AUTO: 7.93 K/UL — SIGNIFICANT CHANGE UP (ref 3.8–10.5)
WBC UR QL: SIGNIFICANT CHANGE UP

## 2019-03-22 PROCEDURE — 93010 ELECTROCARDIOGRAM REPORT: CPT

## 2019-03-22 PROCEDURE — 71045 X-RAY EXAM CHEST 1 VIEW: CPT | Mod: 26

## 2019-03-22 PROCEDURE — 99285 EMERGENCY DEPT VISIT HI MDM: CPT

## 2019-03-22 RX ORDER — MEROPENEM 1 G/30ML
1000 INJECTION INTRAVENOUS EVERY 8 HOURS
Refills: 0 | Status: DISCONTINUED | OUTPATIENT
Start: 2019-03-22 | End: 2019-03-23

## 2019-03-22 RX ORDER — SIMVASTATIN 20 MG/1
10 TABLET, FILM COATED ORAL AT BEDTIME
Refills: 0 | Status: DISCONTINUED | OUTPATIENT
Start: 2019-03-22 | End: 2019-03-27

## 2019-03-22 RX ORDER — SODIUM CHLORIDE 9 MG/ML
1000 INJECTION, SOLUTION INTRAVENOUS
Refills: 0 | Status: DISCONTINUED | OUTPATIENT
Start: 2019-03-22 | End: 2019-03-23

## 2019-03-22 RX ORDER — DOCUSATE SODIUM 100 MG
100 CAPSULE ORAL DAILY
Refills: 0 | Status: DISCONTINUED | OUTPATIENT
Start: 2019-03-22 | End: 2019-03-27

## 2019-03-22 RX ORDER — SODIUM CHLORIDE 9 MG/ML
1000 INJECTION INTRAMUSCULAR; INTRAVENOUS; SUBCUTANEOUS ONCE
Refills: 0 | Status: COMPLETED | OUTPATIENT
Start: 2019-03-22 | End: 2019-03-22

## 2019-03-22 RX ORDER — VANCOMYCIN HCL 1 G
1000 VIAL (EA) INTRAVENOUS EVERY 12 HOURS
Refills: 0 | Status: DISCONTINUED | OUTPATIENT
Start: 2019-03-22 | End: 2019-03-23

## 2019-03-22 RX ORDER — SACCHAROMYCES BOULARDII 250 MG
250 POWDER IN PACKET (EA) ORAL
Refills: 0 | Status: DISCONTINUED | OUTPATIENT
Start: 2019-03-22 | End: 2019-03-27

## 2019-03-22 RX ORDER — LOSARTAN POTASSIUM 100 MG/1
50 TABLET, FILM COATED ORAL DAILY
Refills: 0 | Status: DISCONTINUED | OUTPATIENT
Start: 2019-03-22 | End: 2019-03-27

## 2019-03-22 RX ORDER — HEPARIN SODIUM 5000 [USP'U]/ML
5000 INJECTION INTRAVENOUS; SUBCUTANEOUS EVERY 12 HOURS
Refills: 0 | Status: DISCONTINUED | OUTPATIENT
Start: 2019-03-22 | End: 2019-03-27

## 2019-03-22 RX ORDER — SENNA PLUS 8.6 MG/1
2 TABLET ORAL AT BEDTIME
Refills: 0 | Status: DISCONTINUED | OUTPATIENT
Start: 2019-03-22 | End: 2019-03-27

## 2019-03-22 RX ORDER — ALBUTEROL 90 UG/1
1 AEROSOL, METERED ORAL EVERY 4 HOURS
Refills: 0 | Status: DISCONTINUED | OUTPATIENT
Start: 2019-03-22 | End: 2019-03-27

## 2019-03-22 RX ORDER — VERAPAMIL HCL 240 MG
240 CAPSULE, EXTENDED RELEASE PELLETS 24 HR ORAL DAILY
Refills: 0 | Status: DISCONTINUED | OUTPATIENT
Start: 2019-03-22 | End: 2019-03-27

## 2019-03-22 RX ORDER — QUETIAPINE FUMARATE 200 MG/1
12.5 TABLET, FILM COATED ORAL
Refills: 0 | Status: DISCONTINUED | OUTPATIENT
Start: 2019-03-22 | End: 2019-03-27

## 2019-03-22 RX ORDER — ALBUTEROL 90 UG/1
2.5 AEROSOL, METERED ORAL EVERY 6 HOURS
Refills: 0 | Status: DISCONTINUED | OUTPATIENT
Start: 2019-03-22 | End: 2019-03-27

## 2019-03-22 RX ORDER — VANCOMYCIN HCL 1 G
1000 VIAL (EA) INTRAVENOUS ONCE
Refills: 0 | Status: COMPLETED | OUTPATIENT
Start: 2019-03-22 | End: 2019-03-22

## 2019-03-22 RX ORDER — ALPRAZOLAM 0.25 MG
0.5 TABLET ORAL
Refills: 0 | Status: DISCONTINUED | OUTPATIENT
Start: 2019-03-22 | End: 2019-03-27

## 2019-03-22 RX ORDER — MEMANTINE HYDROCHLORIDE 10 MG/1
10 TABLET ORAL DAILY
Refills: 0 | Status: DISCONTINUED | OUTPATIENT
Start: 2019-03-22 | End: 2019-03-27

## 2019-03-22 RX ORDER — MEROPENEM 1 G/30ML
1000 INJECTION INTRAVENOUS ONCE
Refills: 0 | Status: COMPLETED | OUTPATIENT
Start: 2019-03-22 | End: 2019-03-22

## 2019-03-22 RX ORDER — MEROPENEM 1 G/30ML
INJECTION INTRAVENOUS
Refills: 0 | Status: DISCONTINUED | OUTPATIENT
Start: 2019-03-23 | End: 2019-03-23

## 2019-03-22 RX ORDER — DONEPEZIL HYDROCHLORIDE 10 MG/1
10 TABLET, FILM COATED ORAL AT BEDTIME
Refills: 0 | Status: DISCONTINUED | OUTPATIENT
Start: 2019-03-22 | End: 2019-03-27

## 2019-03-22 RX ORDER — LEVOTHYROXINE SODIUM 125 MCG
100 TABLET ORAL DAILY
Refills: 0 | Status: DISCONTINUED | OUTPATIENT
Start: 2019-03-22 | End: 2019-03-27

## 2019-03-22 RX ORDER — CEFEPIME 1 G/1
2000 INJECTION, POWDER, FOR SOLUTION INTRAMUSCULAR; INTRAVENOUS EVERY 12 HOURS
Refills: 0 | Status: DISCONTINUED | OUTPATIENT
Start: 2019-03-22 | End: 2019-03-22

## 2019-03-22 RX ADMIN — SIMVASTATIN 10 MILLIGRAM(S): 20 TABLET, FILM COATED ORAL at 22:21

## 2019-03-22 RX ADMIN — Medication 250 MILLIGRAM(S): at 13:19

## 2019-03-22 RX ADMIN — MEROPENEM 100 MILLIGRAM(S): 1 INJECTION INTRAVENOUS at 21:25

## 2019-03-22 RX ADMIN — SODIUM CHLORIDE 75 MILLILITER(S): 9 INJECTION, SOLUTION INTRAVENOUS at 21:26

## 2019-03-22 RX ADMIN — QUETIAPINE FUMARATE 12.5 MILLIGRAM(S): 200 TABLET, FILM COATED ORAL at 22:22

## 2019-03-22 RX ADMIN — CEFEPIME 100 MILLIGRAM(S): 1 INJECTION, POWDER, FOR SOLUTION INTRAMUSCULAR; INTRAVENOUS at 12:50

## 2019-03-22 RX ADMIN — SODIUM CHLORIDE 500 MILLILITER(S): 9 INJECTION INTRAMUSCULAR; INTRAVENOUS; SUBCUTANEOUS at 13:00

## 2019-03-22 RX ADMIN — SENNA PLUS 2 TABLET(S): 8.6 TABLET ORAL at 22:22

## 2019-03-22 RX ADMIN — Medication 0.5 MILLIGRAM(S): at 15:23

## 2019-03-22 RX ADMIN — Medication 250 MILLIGRAM(S): at 21:57

## 2019-03-22 RX ADMIN — Medication 1000 MILLIGRAM(S): at 14:19

## 2019-03-22 RX ADMIN — ALBUTEROL 2.5 MILLIGRAM(S): 90 AEROSOL, METERED ORAL at 20:23

## 2019-03-22 RX ADMIN — CEFEPIME 2000 MILLIGRAM(S): 1 INJECTION, POWDER, FOR SOLUTION INTRAMUSCULAR; INTRAVENOUS at 13:19

## 2019-03-22 RX ADMIN — DONEPEZIL HYDROCHLORIDE 10 MILLIGRAM(S): 10 TABLET, FILM COATED ORAL at 22:22

## 2019-03-22 NOTE — ED ADULT NURSE NOTE - OBJECTIVE STATEMENT
Patient here for shortness of breath. patient with dark brown phlegm. Somewhat combative an uncooperative.

## 2019-03-22 NOTE — H&P ADULT - NSICDXPASTMEDICALHX_GEN_ALL_CORE_FT
PAST MEDICAL HISTORY:  Deep vein thrombosis (DVT) of left lower extremity, unspecified chronicity, unspecified vein 2015    Dementia without behavioral disturbance, unspecified dementia type     Hyperlipidemia     Hypertension     Hypothyroidism     Osteoarthritis     Primary osteoarthritis of left knee

## 2019-03-22 NOTE — H&P ADULT - HISTORY OF PRESENT ILLNESS
89 y.o. female with PMHx of Dementia, DVT off AC, ESBL UTIs, HTN, HLD, Hypothyroidism, OA s/p Left TKA sent from SNF due to cough and SOB - treated with po doxycycline w/o improvement.  Pt is demented and unable to provide Hx. Feeling cold, bringing up brownish phlegm

## 2019-03-22 NOTE — ED PROVIDER NOTE - OBJECTIVE STATEMENT
89 y/o female with PMHx of DVT, ESBL, HLD, HTN, Dementia, presents to the ED brought in to be treated for PNA. Patient had a CXR 3/19. Patient is from [facility]. Patient is on Doxycycline.

## 2019-03-22 NOTE — ED ADULT NURSE NOTE - NSIMPLEMENTINTERV_GEN_ALL_ED
Implemented All Fall with Harm Risk Interventions:  [place] to call system. Call bell, personal items and telephone within reach. Instruct patient to call for assistance. Room bathroom lighting operational. Non-slip footwear when patient is off stretcher. Physically safe environment: no spills, clutter or unnecessary equipment. Stretcher in lowest position, wheels locked, appropriate side rails in place. Provide visual cue, wrist band, yellow gown, etc. Monitor gait and stability. Monitor for mental status changes and reorient to person, place, and time. Review medications for side effects contributing to fall risk. Reinforce activity limits and safety measures with patient and family. Provide visual clues: red socks.

## 2019-03-22 NOTE — H&P ADULT - NSHPPHYSICALEXAM_GEN_ALL_CORE
PHYSICAL EXAM:    General: elderly female in no visible distress  Eyes: PERRLA, EOMI; conjunctiva and sclera clear  Head: Normocephalic; atraumatic  ENMT: moist mucosal membranes with dry lips, + phlegm in mouth  Neck: Supple; non tender; no masses  Respiratory: diffuse rhonchi with diminished BS at bases BL  Cardiovascular: S1, S2 reg  Gastrointestinal: Soft abd, NT, + BS  Genitourinary: No costovertebral angle tenderness  Extremities: No clubbing, cyanosis or edema  Vascular: Peripheral pulses palpable 2+ bilaterally  Neurological: Alert, disoriented in place and time, oriented in person  Skin: Warm and dry.   Musculoskeletal: Normal tone, without deformities  Psychiatric: Cooperative

## 2019-03-23 LAB
CULTURE RESULTS: NO GROWTH — SIGNIFICANT CHANGE UP
PROCALCITONIN SERPL-MCNC: 0.13 NG/ML — HIGH (ref 0.02–0.1)
SPECIMEN SOURCE: SIGNIFICANT CHANGE UP

## 2019-03-23 PROCEDURE — 71250 CT THORAX DX C-: CPT | Mod: 26

## 2019-03-23 RX ORDER — AZITHROMYCIN 500 MG/1
500 TABLET, FILM COATED ORAL DAILY
Refills: 0 | Status: DISCONTINUED | OUTPATIENT
Start: 2019-03-23 | End: 2019-03-24

## 2019-03-23 RX ADMIN — MEROPENEM 100 MILLIGRAM(S): 1 INJECTION INTRAVENOUS at 05:51

## 2019-03-23 RX ADMIN — Medication 250 MILLIGRAM(S): at 15:36

## 2019-03-23 RX ADMIN — QUETIAPINE FUMARATE 12.5 MILLIGRAM(S): 200 TABLET, FILM COATED ORAL at 15:36

## 2019-03-23 RX ADMIN — HEPARIN SODIUM 5000 UNIT(S): 5000 INJECTION INTRAVENOUS; SUBCUTANEOUS at 05:52

## 2019-03-23 RX ADMIN — ALBUTEROL 2.5 MILLIGRAM(S): 90 AEROSOL, METERED ORAL at 20:17

## 2019-03-23 RX ADMIN — Medication 100 MICROGRAM(S): at 05:51

## 2019-03-23 RX ADMIN — MEMANTINE HYDROCHLORIDE 10 MILLIGRAM(S): 10 TABLET ORAL at 12:24

## 2019-03-23 RX ADMIN — AZITHROMYCIN 500 MILLIGRAM(S): 500 TABLET, FILM COATED ORAL at 15:33

## 2019-03-23 RX ADMIN — Medication 250 MILLIGRAM(S): at 10:44

## 2019-03-23 RX ADMIN — Medication 100 MILLIGRAM(S): at 12:24

## 2019-03-23 RX ADMIN — ALBUTEROL 2.5 MILLIGRAM(S): 90 AEROSOL, METERED ORAL at 12:00

## 2019-03-23 RX ADMIN — Medication 240 MILLIGRAM(S): at 05:51

## 2019-03-23 RX ADMIN — HEPARIN SODIUM 5000 UNIT(S): 5000 INJECTION INTRAVENOUS; SUBCUTANEOUS at 15:35

## 2019-03-23 RX ADMIN — LOSARTAN POTASSIUM 50 MILLIGRAM(S): 100 TABLET, FILM COATED ORAL at 05:51

## 2019-03-23 RX ADMIN — ALBUTEROL 2.5 MILLIGRAM(S): 90 AEROSOL, METERED ORAL at 01:21

## 2019-03-23 RX ADMIN — SODIUM CHLORIDE 75 MILLILITER(S): 9 INJECTION, SOLUTION INTRAVENOUS at 12:24

## 2019-03-23 RX ADMIN — QUETIAPINE FUMARATE 12.5 MILLIGRAM(S): 200 TABLET, FILM COATED ORAL at 05:51

## 2019-03-23 NOTE — CONSULT NOTE ADULT - SUBJECTIVE AND OBJECTIVE BOX
Patient is a 89y old  Female who presents with a chief complaint of Cough, SOB (23 Mar 2019 12:59)    HPI:  89 y.o. female with PMHx of Dementia, DVT off AC, ESBL UTIs, HTN, HLD, Hypothyroidism, OA s/p Left TKA sent from SNF due to cough and SOB - treated with po doxycycline w/o improvement. Pt is demented and unable to provide Hx. Feeling cold, bringing up brownish phlegm here afebrile, wbc ct nl xray vascular congestion, atelectasis R mid ling/LLL, UA not remarkable, was given IV meropenem/vanco.      PMH: as above  PSH: as above  Meds: per reconciliation sheet, noted below  MEDICATIONS  (STANDING):  ALBUTerol    0.083% 2.5 milliGRAM(s) Nebulizer every 6 hours  ALBUTerol    90 MICROgram(s) HFA Inhaler 1 Puff(s) Inhalation every 4 hours  docusate sodium 100 milliGRAM(s) Oral daily  donepezil 10 milliGRAM(s) Oral at bedtime  heparin  Injectable 5000 Unit(s) SubCutaneous every 12 hours  levothyroxine 100 MICROGram(s) Oral daily  losartan 50 milliGRAM(s) Oral daily  memantine 10 milliGRAM(s) Oral daily  QUEtiapine 12.5 milliGRAM(s) Oral two times a day  saccharomyces boulardii 250 milliGRAM(s) Oral two times a day  senna 2 Tablet(s) Oral at bedtime  simvastatin 10 milliGRAM(s) Oral at bedtime  verapamil  milliGRAM(s) Oral daily        Allergies    No Known Allergies    Intolerances      Social: no smoking, no alcohol, no illegal drugs; no recent travel, no exposure to TB  FAMILY HISTORY:  No pertinent family history in first degree relatives     no history of premature cardiovascular disease in first degree relatives    ROS: no HA, no dizziness, no sore throat, no blurry vision, no CP, no palpitations, no abdominal pain, no diarrhea, no N/V, no dysuria, no leg pain, no claudication, no rash, no joint aches, no rectal pain or bleeding, no night sweats  All other systems reviewed and are negative    Vital Signs Last 24 Hrs  T(C): 37.2 (23 Mar 2019 11:27), Max: 37.2 (22 Mar 2019 21:06)  T(F): 98.9 (23 Mar 2019 11:27), Max: 99 (22 Mar 2019 21:06)  HR: 84 (23 Mar 2019 12:00) (64 - 91)  BP: 122/72 (23 Mar 2019 11:27) (122/72 - 162/80)  BP(mean): --  RR: 20 (23 Mar 2019 11:27) (18 - 20)  SpO2: 94% (23 Mar 2019 11:27) (92% - 94%)  Daily     Daily Weight in k.1 (23 Mar 2019 04:30)    PE:  Constitutional: frail looking  HEENT: NC/AT, EOMI, PERRLA, conjunctivae clear; ears and nose atraumatic; pharynx benign  Neck: supple; thyroid not palpable  Back: no tenderness  Respiratory: decreased breath sounds  Cardiovascular: S1S2 regular, no murmurs  Abdomen: soft, not tender, not distended, positive BS; liver and spleen WNL  Genitourinary: no suprapubic tenderness  Lymphatic: no LN palpable  Musculoskeletal: no muscle tenderness, no joint swelling or tenderness  Extremities: no pedal edema  Neurological/ Psychiatric:  moving all extremities  Skin: no rashes; no palpable lesions    Labs: all available labs reviewed                        13.1   7.93  )-----------( 209      ( 22 Mar 2019 12:19 )             40.7     03-22    147<H>  |  110<H>  |  25<H>  ----------------------------<  100<H>  3.5   |  29  |  0.91    Ca    9.6      22 Mar 2019 12:19    TPro  7.3  /  Alb  2.6<L>  /  TBili  0.5  /  DBili  x   /  AST  55<H>  /  ALT  51  /  AlkPhos  121<H>  03-22     LIVER FUNCTIONS - ( 22 Mar 2019 12:19 )  Alb: 2.6 g/dL / Pro: 7.3 gm/dL / ALK PHOS: 121 U/L / ALT: 51 U/L / AST: 55 U/L / GGT: x           Urinalysis Basic - ( 22 Mar 2019 16:00 )    Color: Yellow / Appearance: Clear / S.020 / pH: x  Gluc: x / Ketone: Negative  / Bili: Negative / Urobili: Negative mg/dL   Blood: x / Protein: 15 mg/dL / Nitrite: Negative   Leuk Esterase: Negative / RBC: 0-5 /HPF / WBC 3-5   Sq Epi: x / Non Sq Epi: Occasional / Bacteria: Occasional          Radiology: all available radiological tests reviewed    EXAM:  XR CHEST PORTABLE IMMED 1V                            PROCEDURE DATE:  2019          INTERPRETATION:  History: Chest pain    Chest:  one view.      Comparison: 2016    AP radiograph of the chest demonstrates mild vascular congestion and   subsegmental atelectasis in the RIGHT midlung and LEFT lower lobe. The   cardiac silhouette is normal in size. Osseous structures are intact.    Impression:mild vascular congestion and subsegmental atelectasis in the   RIGHT midlung and LEFTlower lobe      Advanced directives addressed: full resuscitation

## 2019-03-23 NOTE — PROGRESS NOTE ADULT - SUBJECTIVE AND OBJECTIVE BOX
89 y.o. female with PMHx of Dementia, DVT off AC, ESBL UTIs, HTN, HLD, Hypothyroidism, OA s/p Left TKA sent from SNF due to cough and SOB - treated with po doxycycline w/o improvement.  Pt is demented and unable to provide Hx. Feeling cold, bringing up brownish phlegm.      19: Patient seen and examined. Confused, sitting in a chair.       Vital Signs Last 24 Hrs  T(C): 37.2 (23 Mar 2019 11:27), Max: 37.2 (22 Mar 2019 21:06)  T(F): 98.9 (23 Mar 2019 11:27), Max: 99 (22 Mar 2019 21:06)  HR: 84 (23 Mar 2019 12:00) (64 - 91)  BP: 122/72 (23 Mar 2019 11:27) (122/72 - 162/80)  BP(mean): --  RR: 20 (23 Mar 2019 11:27) (18 - 20)  SpO2: 94% (23 Mar 2019 11:27) (92% - 94%)        PHYSICAL EXAM:    	General: elderly female in no visible distress  	Eyes: PERRLA, EOMI; conjunctiva and sclera clear  	Head: Normocephalic; atraumatic  	ENMT: moist mucosal membranes with dry lips, + phlegm in mouth  	Neck: Supple; non tender; no masses  	Respiratory: diffuse rhonchi with diminished BS at bases BL  	Cardiovascular: S1, S2 reg  	Gastrointestinal: Soft abd, NT, + BS  	Genitourinary: No costovertebral angle tenderness  	Extremities: No clubbing, cyanosis or edema  	Vascular: Peripheral pulses palpable 2+ bilaterally  	Neurological: Alert, disoriented in place and time, oriented in person  	Skin: Warm and dry.   	Musculoskeletal: Normal tone, without deformities          Labs:                               13.1   7.93  )-----------( 209      ( 22 Mar 2019 12:19 )             40.7     22 Mar 2019 12:19    147    |  110    |  25     ----------------------------<  100    3.5     |  29     |  0.91     Ca    9.6        22 Mar 2019 12:19    TPro  7.3    /  Alb  2.6    /  TBili  0.5    /  DBili  x      /  AST  55     /  ALT  51     /  AlkPhos  121    22 Mar 2019 12:19    LIVER FUNCTIONS - ( 22 Mar 2019 12:19 )  Alb: 2.6 g/dL / Pro: 7.3 gm/dL / ALK PHOS: 121 U/L / ALT: 51 U/L / AST: 55 U/L / GGT: x           PT/INR - ( 22 Mar 2019 12:19 )   PT: 13.1 sec;   INR: 1.17 ratio         PTT - ( 22 Mar 2019 12:19 )  PTT:31.5 sec  CAPILLARY BLOOD GLUCOSE        CARDIAC MARKERS ( 22 Mar 2019 12:19 )  <0.015 ng/mL / x     / x     / x     / x          Urinalysis Basic - ( 22 Mar 2019 16:00 )    Color: Yellow / Appearance: Clear / S.020 / pH: x  Gluc: x / Ketone: Negative  / Bili: Negative / Urobili: Negative mg/dL   Blood: x / Protein: 15 mg/dL / Nitrite: Negative   Leuk Esterase: Negative / RBC: 0-5 /HPF / WBC 3-5   Sq Epi: x / Non Sq Epi: Occasional / Bacteria: Occasional          MEDICATIONS:    ALBUTerol    0.083% 2.5 milliGRAM(s) Nebulizer every 6 hours  ALBUTerol    90 MICROgram(s) HFA Inhaler 1 Puff(s) Inhalation every 4 hours  docusate sodium 100 milliGRAM(s) Oral daily  donepezil 10 milliGRAM(s) Oral at bedtime  heparin  Injectable 5000 Unit(s) SubCutaneous every 12 hours  levothyroxine 100 MICROGram(s) Oral daily  losartan 50 milliGRAM(s) Oral daily  memantine 10 milliGRAM(s) Oral daily  meropenem  IVPB      meropenem  IVPB 1000 milliGRAM(s) IV Intermittent every 8 hours  QUEtiapine 12.5 milliGRAM(s) Oral two times a day  saccharomyces boulardii 250 milliGRAM(s) Oral two times a day  senna 2 Tablet(s) Oral at bedtime  simvastatin 10 milliGRAM(s) Oral at bedtime  verapamil  milliGRAM(s) Oral daily    MEDICATIONS  (PRN):  ALPRAZolam 0.5 milliGRAM(s) Oral two times a day PRN anxiety              Assessment and Plan:   Assessment:  · Assessment		  89 y.o. female with PMHx of Dementia, DVT off AC, ESBL UTIs, HTN, HLD, Hypothyroidism, OA s/p Left TKA sent from SNF due to cough and SOB - treated with po doxycycline w/o improvement.    1. SOB/cough - likely gram pos/neg bacteria PNA BL   - IV abx empiric, ID eval, BCx/UCx/sputum Cx   - add nebulizer   - ID eval apprecaited.  -Check CT chest   - florastor    2. Hx of ESBL UTIs - f/u UCx    3. Hx of DVT - off AC,   VTE proph UFH    4. Hypothyroidism - synthroid'    5. HTN-stable  Continue verapamil and cozaar    6. Dementia-  Supportive care  Fall precautions    7. Hypernatremia hypovolemic - LR for 24h  Follow

## 2019-03-23 NOTE — CONSULT NOTE ADULT - ASSESSMENT
89 y.o. female with PMHx of Dementia, DVT off AC, ESBL UTIs, HTN, HLD, Hypothyroidism, OA s/p Left TKA sent from SNF due to cough and SOB - treated with po doxycycline w/o improvement. Pt is demented and unable to provide Hx. Feeling cold, bringing up brownish phlegm here afebrile, wbc ct nl xray vascular congestion, atelectasis R mid ling/LLL, UA not remarkable, was given IV meropenem/vanco.    1. cough. sob. pneumonia. alzheimers dementia  - UA not remarkable doubt UTI  - stop meropenem/vanco  - switch to cefepime/azithromycin  - f/u cultures  - monitor temps  - tolerating abx well so far; no side effects noted  - reason for abx use and side effects reviewed with patient  - supportive care  - fu cbc    2. other issues - care per medicine

## 2019-03-24 RX ORDER — CEFEPIME 1 G/1
2000 INJECTION, POWDER, FOR SOLUTION INTRAMUSCULAR; INTRAVENOUS ONCE
Refills: 0 | Status: COMPLETED | OUTPATIENT
Start: 2019-03-24 | End: 2019-03-24

## 2019-03-24 RX ORDER — CEFEPIME 1 G/1
1000 INJECTION, POWDER, FOR SOLUTION INTRAMUSCULAR; INTRAVENOUS EVERY 12 HOURS
Refills: 0 | Status: DISCONTINUED | OUTPATIENT
Start: 2019-03-24 | End: 2019-03-24

## 2019-03-24 RX ORDER — CEFEPIME 1 G/1
2000 INJECTION, POWDER, FOR SOLUTION INTRAMUSCULAR; INTRAVENOUS EVERY 12 HOURS
Refills: 0 | Status: DISCONTINUED | OUTPATIENT
Start: 2019-03-24 | End: 2019-03-27

## 2019-03-24 RX ORDER — AZITHROMYCIN 500 MG/1
500 TABLET, FILM COATED ORAL EVERY 24 HOURS
Refills: 0 | Status: DISCONTINUED | OUTPATIENT
Start: 2019-03-24 | End: 2019-03-27

## 2019-03-24 RX ORDER — CEFEPIME 1 G/1
INJECTION, POWDER, FOR SOLUTION INTRAMUSCULAR; INTRAVENOUS
Refills: 0 | Status: DISCONTINUED | OUTPATIENT
Start: 2019-03-24 | End: 2019-03-24

## 2019-03-24 RX ORDER — CEFEPIME 1 G/1
INJECTION, POWDER, FOR SOLUTION INTRAMUSCULAR; INTRAVENOUS
Refills: 0 | Status: DISCONTINUED | OUTPATIENT
Start: 2019-03-24 | End: 2019-03-27

## 2019-03-24 RX ADMIN — HEPARIN SODIUM 5000 UNIT(S): 5000 INJECTION INTRAVENOUS; SUBCUTANEOUS at 06:11

## 2019-03-24 RX ADMIN — ALBUTEROL 2.5 MILLIGRAM(S): 90 AEROSOL, METERED ORAL at 01:38

## 2019-03-24 RX ADMIN — CEFEPIME 100 MILLIGRAM(S): 1 INJECTION, POWDER, FOR SOLUTION INTRAMUSCULAR; INTRAVENOUS at 10:09

## 2019-03-24 RX ADMIN — CEFEPIME 100 MILLIGRAM(S): 1 INJECTION, POWDER, FOR SOLUTION INTRAMUSCULAR; INTRAVENOUS at 17:13

## 2019-03-24 RX ADMIN — HEPARIN SODIUM 5000 UNIT(S): 5000 INJECTION INTRAVENOUS; SUBCUTANEOUS at 17:15

## 2019-03-24 RX ADMIN — AZITHROMYCIN 255 MILLIGRAM(S): 500 TABLET, FILM COATED ORAL at 11:43

## 2019-03-24 NOTE — PROGRESS NOTE ADULT - SUBJECTIVE AND OBJECTIVE BOX
cc: cough, sob  · Subjective and Objective: 	  89 y.o. female with PMHx of Dementia, DVT off AC, ESBL UTIs, HTN, HLD, Hypothyroidism, OA s/p Left TKA sent from SNF due to cough and SOB - treated with po doxycycline w/o improvement. Pt is demented and unable to provide Hx. Feeling cold, bringing up brownish phlegm.  Pt admitted for RLL PNA and treating with IV abx.  Pt tolerating medications.  At bedside she offers no complaints.  Mentation at baseline.  Denies fever, chills, n, v, CP, sob.      REVIEW OF SYSTEMS: All other review of systems is negative unless indicated above.    Vital Signs Last 24 Hrs  T(C): 36.6 (24 Mar 2019 06:38), Max: 37.3 (23 Mar 2019 16:47)  T(F): 97.8 (24 Mar 2019 06:38), Max: 99.2 (23 Mar 2019 16:47)  HR: 102 (24 Mar 2019 06:38) (63 - 102)  BP: 152/82 (24 Mar 2019 06:38) (137/71 - 152/82)  BP(mean): --  RR: 20 (24 Mar 2019 06:38) (18 - 20)  SpO2: 93% (24 Mar 2019 06:38) (93% - 96%)        PHYSICAL EXAM:  General: NAD, confused  Eyes: PERRLA, EOMI; conjunctiva and sclera clear  Head: Normocephalic; atraumatic  ENMT: moist mucosal membranes with dry lips, + phlegm in mouth  Neck: Supple; non tender; no masses  Respiratory: diffuse rhonchi with diminished BS at bases BL - improving   Cardiovascular: S1, S2 reg  Gastrointestinal: Soft abd, NT, + BS  Genitourinary: No costovertebral angle tenderness  Extremities: No clubbing, cyanosis or edema  Vascular: Peripheral pulses palpable 2+ bilaterally  Neurological: Alert, disoriented in place and time, oriented in person  Skin: Warm and dry.   Musculoskeletal: Normal tone, without deformities      MEDICATIONS  (STANDING):  ALBUTerol    0.083% 2.5 milliGRAM(s) Nebulizer every 6 hours  ALBUTerol    90 MICROgram(s) HFA Inhaler 1 Puff(s) Inhalation every 4 hours  azithromycin  IVPB 500 milliGRAM(s) IV Intermittent every 24 hours  cefepime   IVPB 2000 milliGRAM(s) IV Intermittent every 12 hours  cefepime   IVPB      docusate sodium 100 milliGRAM(s) Oral daily  donepezil 10 milliGRAM(s) Oral at bedtime  heparin  Injectable 5000 Unit(s) SubCutaneous every 12 hours  levothyroxine 100 MICROGram(s) Oral daily  losartan 50 milliGRAM(s) Oral daily  memantine 10 milliGRAM(s) Oral daily  QUEtiapine 12.5 milliGRAM(s) Oral two times a day  saccharomyces boulardii 250 milliGRAM(s) Oral two times a day  senna 2 Tablet(s) Oral at bedtime  simvastatin 10 milliGRAM(s) Oral at bedtime  verapamil  milliGRAM(s) Oral daily    MEDICATIONS  (PRN):  ALPRAZolam 0.5 milliGRAM(s) Oral two times a day PRN anxiety                CAPILLARY BLOOD GLUCOSE        Urinalysis Basic - ( 22 Mar 2019 16:00 )    Color: Yellow / Appearance: Clear / S.020 / pH: x  Gluc: x / Ketone: Negative  / Bili: Negative / Urobili: Negative mg/dL   Blood: x / Protein: 15 mg/dL / Nitrite: Negative   Leuk Esterase: Negative / RBC: 0-5 /HPF / WBC 3-5   Sq Epi: x / Non Sq Epi: Occasional / Bacteria: Occasional        Assessment and Plan:  89 y.o. female with PMHx of Dementia, DVT off AC, ESBL UTIs, HTN, HLD, Hypothyroidism, OA s/p Left TKA sent from SNF due to cough and SOB - treated with po doxycycline w/o improvement.    SOB/cough: likely gram pos/neg bacteria PNA BL  -IV abx empiric abx with cefepime and azithromycin for probable gram neg bacteria coverage  -nebs  -ID eval appreciated  -CT chest suggestive of RLL PNA.   -florastor    Hx of ESBL UTIs: No growth on UCx or blood cultures.     Hx of DVT:  - off AC,   -VTE proph UFH    Hypothyroidism - synthroid    HTN: stable  -Continue verapamil and cozaar    Dementia:  -Supportive care  -Fall precautions    Hypernatremia hypovolemic: Mild  -s/p IVFs  -declines further blood work    Dispo:  -d/c once medically stable and treated for PNA.                                                         Electronic Signatures:  Tara Hartman)  (Signed 23-Mar-2019 13:07)  	Authored: Progress Note, Reason for Admission, Subjective and Objective      Last Updated: 23-Mar-2019 13:07 by Tara Hartman) cc: cough, sob  · Subjective and Objective: 	  89 y.o. female with PMHx of Dementia, DVT off AC, ESBL UTIs, HTN, HLD, Hypothyroidism, OA s/p Left TKA sent from SNF due to cough and SOB - treated with po doxycycline w/o improvement. Pt is demented and unable to provide Hx. Feeling cold, bringing up brownish phlegm.  Pt admitted for RLL PNA and treating with IV abx.  Pt tolerating medications.  At bedside she offers no complaints.  Mentation at baseline.  Denies fever, chills, n, v, CP, sob.      REVIEW OF SYSTEMS: All other review of systems is negative unless indicated above.    Vital Signs Last 24 Hrs  T(C): 36.6 (24 Mar 2019 06:38), Max: 37.3 (23 Mar 2019 16:47)  T(F): 97.8 (24 Mar 2019 06:38), Max: 99.2 (23 Mar 2019 16:47)  HR: 102 (24 Mar 2019 06:38) (63 - 102)  BP: 152/82 (24 Mar 2019 06:38) (137/71 - 152/82)  BP(mean): --  RR: 20 (24 Mar 2019 06:38) (18 - 20)  SpO2: 93% (24 Mar 2019 06:38) (93% - 96%)        PHYSICAL EXAM:  General: NAD, confused  Eyes: PERRLA, EOMI; conjunctiva and sclera clear  Head: Normocephalic; atraumatic  ENMT: moist mucosal membranes with dry lips, + phlegm in mouth  Neck: Supple; non tender; no masses  Respiratory: diffuse rhonchi with diminished BS at bases BL - improving   Cardiovascular: S1, S2 reg, + murmur  Gastrointestinal: Soft abd, NT, + BS  Genitourinary: No costovertebral angle tenderness  Extremities: No clubbing, cyanosis or edema  Vascular: Peripheral pulses palpable 2+ bilaterally  Neurological: Alert, disoriented in place and time, oriented in person  Skin: Warm and dry.   Musculoskeletal: Normal tone, without deformities      MEDICATIONS  (STANDING):  ALBUTerol    0.083% 2.5 milliGRAM(s) Nebulizer every 6 hours  ALBUTerol    90 MICROgram(s) HFA Inhaler 1 Puff(s) Inhalation every 4 hours  azithromycin  IVPB 500 milliGRAM(s) IV Intermittent every 24 hours  cefepime   IVPB 2000 milliGRAM(s) IV Intermittent every 12 hours  cefepime   IVPB      docusate sodium 100 milliGRAM(s) Oral daily  donepezil 10 milliGRAM(s) Oral at bedtime  heparin  Injectable 5000 Unit(s) SubCutaneous every 12 hours  levothyroxine 100 MICROGram(s) Oral daily  losartan 50 milliGRAM(s) Oral daily  memantine 10 milliGRAM(s) Oral daily  QUEtiapine 12.5 milliGRAM(s) Oral two times a day  saccharomyces boulardii 250 milliGRAM(s) Oral two times a day  senna 2 Tablet(s) Oral at bedtime  simvastatin 10 milliGRAM(s) Oral at bedtime  verapamil  milliGRAM(s) Oral daily    MEDICATIONS  (PRN):  ALPRAZolam 0.5 milliGRAM(s) Oral two times a day PRN anxiety                CAPILLARY BLOOD GLUCOSE        Urinalysis Basic - ( 22 Mar 2019 16:00 )    Color: Yellow / Appearance: Clear / S.020 / pH: x  Gluc: x / Ketone: Negative  / Bili: Negative / Urobili: Negative mg/dL   Blood: x / Protein: 15 mg/dL / Nitrite: Negative   Leuk Esterase: Negative / RBC: 0-5 /HPF / WBC 3-5   Sq Epi: x / Non Sq Epi: Occasional / Bacteria: Occasional        Assessment and Plan:  89 y.o. female with PMHx of Dementia, DVT off AC, ESBL UTIs, HTN, HLD, Hypothyroidism, OA s/p Left TKA sent from SNF due to cough and SOB - treated with po doxycycline w/o improvement.    SOB/cough: likely gram pos/neg bacteria PNA BL  -IV abx empiric abx with cefepime and azithromycin for probable gram neg bacteria coverage  -nebs  -ID eval appreciated  -CT chest suggestive of RLL PNA.   -florastor    Hx of ESBL UTIs: No growth on UCx or blood cultures.     Hx of DVT:  - off AC,   -VTE proph UFH    Hypothyroidism - synthroid    HTN: stable  -Continue verapamil and cozaar    Dementia:  -Supportive care  -Fall precautions    Hypernatremia hypovolemic: Mild  -s/p IVFs  -declines further blood work    Dispo:  -d/c once medically stable and treated for PNA.                                                         Electronic Signatures:  Tara Hartman)  (Signed 23-Mar-2019 13:07)  	Authored: Progress Note, Reason for Admission, Subjective and Objective      Last Updated: 23-Mar-2019 13:07 by Tara Hartman)

## 2019-03-25 RX ADMIN — CEFEPIME 100 MILLIGRAM(S): 1 INJECTION, POWDER, FOR SOLUTION INTRAMUSCULAR; INTRAVENOUS at 06:09

## 2019-03-25 RX ADMIN — AZITHROMYCIN 255 MILLIGRAM(S): 500 TABLET, FILM COATED ORAL at 13:10

## 2019-03-25 RX ADMIN — ALBUTEROL 2.5 MILLIGRAM(S): 90 AEROSOL, METERED ORAL at 08:46

## 2019-03-25 RX ADMIN — HEPARIN SODIUM 5000 UNIT(S): 5000 INJECTION INTRAVENOUS; SUBCUTANEOUS at 06:09

## 2019-03-25 RX ADMIN — Medication 240 MILLIGRAM(S): at 06:29

## 2019-03-25 RX ADMIN — QUETIAPINE FUMARATE 12.5 MILLIGRAM(S): 200 TABLET, FILM COATED ORAL at 17:51

## 2019-03-25 RX ADMIN — Medication 250 MILLIGRAM(S): at 17:52

## 2019-03-25 RX ADMIN — Medication 100 MICROGRAM(S): at 06:29

## 2019-03-25 RX ADMIN — QUETIAPINE FUMARATE 12.5 MILLIGRAM(S): 200 TABLET, FILM COATED ORAL at 06:27

## 2019-03-25 RX ADMIN — HEPARIN SODIUM 5000 UNIT(S): 5000 INJECTION INTRAVENOUS; SUBCUTANEOUS at 17:52

## 2019-03-25 RX ADMIN — Medication 100 MILLIGRAM(S): at 13:10

## 2019-03-25 RX ADMIN — Medication 250 MILLIGRAM(S): at 06:27

## 2019-03-25 RX ADMIN — LOSARTAN POTASSIUM 50 MILLIGRAM(S): 100 TABLET, FILM COATED ORAL at 06:29

## 2019-03-25 RX ADMIN — CEFEPIME 100 MILLIGRAM(S): 1 INJECTION, POWDER, FOR SOLUTION INTRAMUSCULAR; INTRAVENOUS at 17:52

## 2019-03-25 RX ADMIN — MEMANTINE HYDROCHLORIDE 10 MILLIGRAM(S): 10 TABLET ORAL at 13:03

## 2019-03-25 NOTE — DIETITIAN INITIAL EVALUATION ADULT. - ORAL INTAKE PTA
Pt seen with lunch. Pt ate about 25% of Lunch, did note complete 03/25/18. Pt was sleeping at time/poor

## 2019-03-25 NOTE — PROGRESS NOTE ADULT - SUBJECTIVE AND OBJECTIVE BOX
Date of service: 19 @ 11:37    pt seen and examined  less cough  no resp distress    ROS: no fever or chills; denies dizziness, no HA, no abdominal pain, no diarrhea or constipation; no dysuria, no urinary frequency, no legs pain, no rashes    MEDICATIONS  (STANDING):  ALBUTerol    0.083% 2.5 milliGRAM(s) Nebulizer every 6 hours  ALBUTerol    90 MICROgram(s) HFA Inhaler 1 Puff(s) Inhalation every 4 hours  azithromycin  IVPB 500 milliGRAM(s) IV Intermittent every 24 hours  cefepime   IVPB 2000 milliGRAM(s) IV Intermittent every 12 hours  cefepime   IVPB      docusate sodium 100 milliGRAM(s) Oral daily  donepezil 10 milliGRAM(s) Oral at bedtime  heparin  Injectable 5000 Unit(s) SubCutaneous every 12 hours  levothyroxine 100 MICROGram(s) Oral daily  losartan 50 milliGRAM(s) Oral daily  memantine 10 milliGRAM(s) Oral daily  QUEtiapine 12.5 milliGRAM(s) Oral two times a day  saccharomyces boulardii 250 milliGRAM(s) Oral two times a day  senna 2 Tablet(s) Oral at bedtime  simvastatin 10 milliGRAM(s) Oral at bedtime  verapamil  milliGRAM(s) Oral daily      Vital Signs Last 24 Hrs  T(C): 37.3 (25 Mar 2019 06:29), Max: 37.3 (25 Mar 2019 06:29)  T(F): 99.1 (25 Mar 2019 06:29), Max: 99.1 (25 Mar 2019 06:29)  HR: 72 (25 Mar 2019 08:45) (72 - 74)  BP: 177/82 (25 Mar 2019 06:29) (158/92 - 177/82)  BP(mean): --  RR: 20 (25 Mar 2019 06:29) (19 - 20)  SpO2: 93% (25 Mar 2019 06:29) (92% - 93%)      PE:  Constitutional: frail looking  HEENT: NC/AT, EOMI, PERRLA, conjunctivae clear; ears and nose atraumatic; pharynx benign  Neck: supple; thyroid not palpable  Back: no tenderness  Respiratory: decreased breath sounds  Cardiovascular: S1S2 regular, no murmurs  Abdomen: soft, not tender, not distended, positive BS; liver and spleen WNL  Genitourinary: no suprapubic tenderness  Lymphatic: no LN palpable  Musculoskeletal: no muscle tenderness, no joint swelling or tenderness  Extremities: no pedal edema  Neurological/ Psychiatric:  moving all extremities  Skin: no rashes; no palpable lesions    Labs: all available labs reviewed      Urinalysis Basic - ( 22 Mar 2019 16:00 )    Color: Yellow / Appearance: Clear / S.020 / pH: x  Gluc: x / Ketone: Negative  / Bili: Negative / Urobili: Negative mg/dL   Blood: x / Protein: 15 mg/dL / Nitrite: Negative   Leuk Esterase: Negative / RBC: 0-5 /HPF / WBC 3-5   Sq Epi: x / Non Sq Epi: Occasional / Bacteria: Occasional             Cultures:   Culture - Urine (19 @ 18:00)    Specimen Source: .Urine Catheterized    Culture Results:   No growth    Culture - Blood (19 @ 13:46)    Specimen Source: .Blood None    Culture Results:   No growth to date.              Radiology: all available radiological tests reviewed    EXAM:  XR CHEST PORTABLE IMMED 1V                            PROCEDURE DATE:  2019          INTERPRETATION:  History: Chest pain    Chest:  one view.      Comparison: 2016    AP radiograph of the chest demonstrates mild vascular congestion and   subsegmental atelectasis in the RIGHT midlung and LEFT lower lobe. The   cardiac silhouette is normal in size. Osseous structures are intact.    Impression:mild vascular congestion and subsegmental atelectasis in the   RIGHT midlung and LEFTlower lobe      Advanced directives addressed: full resuscitation

## 2019-03-25 NOTE — PROGRESS NOTE ADULT - SUBJECTIVE AND OBJECTIVE BOX
cc: cough, sob  · Subjective and Objective: 	  89 y.o. female with PMHx of Dementia, DVT off AC, ESBL UTIs, HTN, HLD, Hypothyroidism, OA s/p Left TKA sent from SNF due to cough and SOB - treated with po doxycycline w/o improvement. Pt is demented and unable to provide Hx. Feeling cold, bringing up brownish phlegm.  Pt admitted for RLL PNA and treating with IV abx.  Pt tolerating medications.  At bedside she offers no complaints.  Mentation at baseline.  Denies fever, chills, n, v, CP, sob.      3/25: Pt lying in bed, less alert today, but offers no complaints.     REVIEW OF SYSTEMS: All other review of systems is negative unless indicated above.    Vital Signs Last 24 Hrs  T(C): 37.3 (25 Mar 2019 11:43), Max: 37.3 (25 Mar 2019 06:29)  T(F): 99.1 (25 Mar 2019 11:43), Max: 99.1 (25 Mar 2019 06:29)  HR: 69 (25 Mar 2019 11:43) (69 - 74)  BP: 104/59 (25 Mar 2019 11:43) (104/59 - 177/82)  BP(mean): --  RR: 19 (25 Mar 2019 11:43) (19 - 20)  SpO2: 93% (25 Mar 2019 11:43) (92% - 93%)    PHYSICAL EXAM:  General: NAD, confused  Eyes: PERRLA, EOMI; conjunctiva and sclera clear  Head: Normocephalic; atraumatic  ENMT: moist mucosal membranes with dry lips  Neck: Supple; non tender; no masses  Respiratory: diffuse rhonchi with diminished BS at bases BL - improving   Cardiovascular: S1, S2 reg, + murmur  Gastrointestinal: Soft abd, NT, + BS  Genitourinary: No costovertebral angle tenderness  Extremities: No clubbing, cyanosis or edema  Vascular: Peripheral pulses palpable 2+ bilaterally  Neurological: Alert, disoriented in place and time, oriented in person  Skin: Warm and dry.   Musculoskeletal: Normal tone, without deformities    MEDICATIONS  (STANDING):  ALBUTerol    0.083% 2.5 milliGRAM(s) Nebulizer every 6 hours  ALBUTerol    90 MICROgram(s) HFA Inhaler 1 Puff(s) Inhalation every 4 hours  azithromycin  IVPB 500 milliGRAM(s) IV Intermittent every 24 hours  cefepime   IVPB 2000 milliGRAM(s) IV Intermittent every 12 hours  cefepime   IVPB      docusate sodium 100 milliGRAM(s) Oral daily  donepezil 10 milliGRAM(s) Oral at bedtime  heparin  Injectable 5000 Unit(s) SubCutaneous every 12 hours  levothyroxine 100 MICROGram(s) Oral daily  losartan 50 milliGRAM(s) Oral daily  memantine 10 milliGRAM(s) Oral daily  QUEtiapine 12.5 milliGRAM(s) Oral two times a day  saccharomyces boulardii 250 milliGRAM(s) Oral two times a day  senna 2 Tablet(s) Oral at bedtime  simvastatin 10 milliGRAM(s) Oral at bedtime  verapamil  milliGRAM(s) Oral daily    MEDICATIONS  (PRN):  ALPRAZolam 0.5 milliGRAM(s) Oral two times a day PRN anxiety      CAPILLARY BLOOD GLUCOSE      Assessment and Plan:  89 y.o. female with PMHx of Dementia, DVT off AC, ESBL UTIs, HTN, HLD, Hypothyroidism, OA s/p Left TKA sent from SNF due to cough and SOB - treated with po doxycycline w/o improvement.    SOB/cough: likely gram pos/neg bacteria PNA:   -IV abx empiric abx with cefepime and azithromycin for probable gram neg bacteria coverage day # 4 of 5  -nebs  -ID eval appreciated  -CT chest suggestive of RLL PNA.   -florastor    Hx of ESBL UTIs: No growth on UCx or blood cultures.     Hx of DVT:  - off AC,   -VTE proph UFH    Hypothyroidism - synthroid    HTN: stable  -Continue verapamil and cozaar    Dementia:  -Supportive care  -Fall precautions    Hypernatremia hypovolemic: Mild  -s/p IVFs  -declines further blood work    Dispo:  -d/c once medically stable and treated for PNA back to [facility] w/ home care.                                                        Electronic Signatures:  Tara Hartman)  (Signed 23-Mar-2019 13:07)  	Authored: Progress Note, Reason for Admission, Subjective and Objective      Last Updated: 23-Mar-2019 13:07 by Tara Hartman) cc: cough, sob  · Subjective and Objective: 	  89 y.o. female with PMHx of Dementia, DVT off AC, ESBL UTIs, HTN, HLD, Hypothyroidism, OA s/p Left TKA sent from SNF due to cough and SOB - treated with po doxycycline w/o improvement. Pt is demented and unable to provide Hx. Feeling cold, bringing up brownish phlegm.  Pt admitted for RLL PNA and treating with IV abx.  Pt tolerating medications.  At bedside she offers no complaints.  Mentation at baseline.  Denies fever, chills, n, v, CP, sob.      3/25: Pt lying in bed, less alert today, but offers no complaints.     REVIEW OF SYSTEMS: All other review of systems is negative unless indicated above.    Vital Signs Last 24 Hrs  T(C): 37.3 (25 Mar 2019 11:43), Max: 37.3 (25 Mar 2019 06:29)  T(F): 99.1 (25 Mar 2019 11:43), Max: 99.1 (25 Mar 2019 06:29)  HR: 69 (25 Mar 2019 11:43) (69 - 74)  BP: 104/59 (25 Mar 2019 11:43) (104/59 - 177/82)  BP(mean): --  RR: 19 (25 Mar 2019 11:43) (19 - 20)  SpO2: 93% (25 Mar 2019 11:43) (92% - 93%)    PHYSICAL EXAM:  General: NAD, confused  Eyes: PERRLA, EOMI; conjunctiva and sclera clear  Head: Normocephalic; atraumatic  ENMT: moist mucosal membranes with dry lips  Neck: Supple; non tender; no masses  Respiratory: diffuse rhonchi with diminished BS at bases BL - improving   Cardiovascular: S1, S2 reg, + murmur  Gastrointestinal: Soft abd, NT, + BS  Genitourinary: No costovertebral angle tenderness  Extremities: No clubbing, cyanosis or edema  Vascular: Peripheral pulses palpable 2+ bilaterally  Neurological: Alert, disoriented in place and time, oriented in person  Skin: Warm and dry.   Musculoskeletal: Normal tone, without deformities    MEDICATIONS  (STANDING):  ALBUTerol    0.083% 2.5 milliGRAM(s) Nebulizer every 6 hours  ALBUTerol    90 MICROgram(s) HFA Inhaler 1 Puff(s) Inhalation every 4 hours  azithromycin  IVPB 500 milliGRAM(s) IV Intermittent every 24 hours  cefepime   IVPB 2000 milliGRAM(s) IV Intermittent every 12 hours  cefepime   IVPB      docusate sodium 100 milliGRAM(s) Oral daily  donepezil 10 milliGRAM(s) Oral at bedtime  heparin  Injectable 5000 Unit(s) SubCutaneous every 12 hours  levothyroxine 100 MICROGram(s) Oral daily  losartan 50 milliGRAM(s) Oral daily  memantine 10 milliGRAM(s) Oral daily  QUEtiapine 12.5 milliGRAM(s) Oral two times a day  saccharomyces boulardii 250 milliGRAM(s) Oral two times a day  senna 2 Tablet(s) Oral at bedtime  simvastatin 10 milliGRAM(s) Oral at bedtime  verapamil  milliGRAM(s) Oral daily    MEDICATIONS  (PRN):  ALPRAZolam 0.5 milliGRAM(s) Oral two times a day PRN anxiety      CAPILLARY BLOOD GLUCOSE      Assessment and Plan:  89 y.o. female with PMHx of Dementia, DVT off AC, ESBL UTIs, HTN, HLD, Hypothyroidism, OA s/p Left TKA sent from SNF due to cough and SOB - treated with po doxycycline w/o improvement.    SOB/cough with acute hypoxic respiratory failure: likely gram pos/neg bacteria PNA:   -IV abx empiric abx with cefepime and azithromycin for probable gram neg bacteria coverage day # 4 of 5  -nebs  -ID eval appreciated  -CT chest suggestive of RLL PNA.  -acute hypoxic respiratory failure resolved   -florastor    Hx of ESBL UTIs: No growth on UCx or blood cultures.     Hx of DVT:  - off AC,   -VTE proph UFH    Hypothyroidism - synthroid    HTN: stable  -Continue verapamil and cozaar    Dementia:  -Supportive care  -Fall precautions    Hypernatremia hypovolemic: Mild  -s/p IVFs  -declines further blood work    Dispo:  -d/c once medically stable and treated for PNA back to [facility] w/ home care.                                                        Electronic Signatures:  Tara Hartman)  (Signed 23-Mar-2019 13:07)  	Authored: Progress Note, Reason for Admission, Subjective and Objective      Last Updated: 23-Mar-2019 13:07 by Tara Hartman)

## 2019-03-25 NOTE — PHYSICAL THERAPY INITIAL EVALUATION ADULT - ADDITIONAL COMMENTS
lives in assisted living. walked with RW, Info obtained from eval 1/2017. lives in assisted living. walked with RW, Info obtained from eval 1/2017. Pt is not a good historian of PLOF or home environment.

## 2019-03-25 NOTE — DIETITIAN INITIAL EVALUATION ADULT. - OTHER INFO
Pt seen for consult assessment. Pt noted with dementia, DVT, hypothyroidism, HTN, HLD. Pt sleeping at time of interview. Appears pt's appetite is reduced. Pt's wt appear stable since the last RD note. Pt likely is eating well. Pt's tyler 13, bilateral heel stage I PU, Recommend c/w current nutrition care plan. Will continue to monitor pt's nutritional status

## 2019-03-25 NOTE — PHYSICAL THERAPY INITIAL EVALUATION ADULT - GENERAL OBSERVATIONS, REHAB EVAL
Pt found suipne in bed sleeping. Pt having dificulty keeping her eyes open to verbal and manual cues Pt found supine in bed sleeping. Pt having dificulty keeping her eyes open to verbal and manual cues. Pt with no c/o pain and not very motivated with this therapist. PAINAD-0/10.

## 2019-03-25 NOTE — PHYSICAL THERAPY INITIAL EVALUATION ADULT - LEVEL OF INDEPENDENCE: SUPINE/SIT, REHAB EVAL
Did not attempt OOB at this time secondary to difficulty following commands and difficulty keeping her eyes open.

## 2019-03-25 NOTE — DIETITIAN INITIAL EVALUATION ADULT. - PERTINENT MEDS FT
MEDICATIONS  (STANDING):  ALBUTerol    0.083% 2.5 milliGRAM(s) Nebulizer every 6 hours  ALBUTerol    90 MICROgram(s) HFA Inhaler 1 Puff(s) Inhalation every 4 hours  azithromycin  IVPB 500 milliGRAM(s) IV Intermittent every 24 hours  cefepime   IVPB 2000 milliGRAM(s) IV Intermittent every 12 hours  cefepime   IVPB      docusate sodium 100 milliGRAM(s) Oral daily  donepezil 10 milliGRAM(s) Oral at bedtime  heparin  Injectable 5000 Unit(s) SubCutaneous every 12 hours  levothyroxine 100 MICROGram(s) Oral daily  losartan 50 milliGRAM(s) Oral daily  memantine 10 milliGRAM(s) Oral daily  QUEtiapine 12.5 milliGRAM(s) Oral two times a day  saccharomyces boulardii 250 milliGRAM(s) Oral two times a day  senna 2 Tablet(s) Oral at bedtime  simvastatin 10 milliGRAM(s) Oral at bedtime  verapamil  milliGRAM(s) Oral daily    MEDICATIONS  (PRN):  ALPRAZolam 0.5 milliGRAM(s) Oral two times a day PRN anxiety

## 2019-03-25 NOTE — PHYSICAL THERAPY INITIAL EVALUATION ADULT - MANUAL MUSCLE TESTING RESULTS, REHAB EVAL
Pt having difficulty following commands to assess strength via MMT. Pt also having difficulty keeping her eyes open to verbal or manual cues. Grossly throughout bilateral UE's and LE's 3/5.

## 2019-03-25 NOTE — CDI QUERY NOTE - NSCDIOTHERTXTBX_GEN_ALL_CORE_HH
Documentation on chart of gram neg steph PNA.    RR: 19 (25 Mar 2019 11:43) (18 - 25)    SpO2: 93% (25 Mar 2019 11:43) (92% - 96%)    Respiratory: diffuse rhonchi with diminished BS at bases BL    Pulse ox  93% on 2 liter supplemental O2.    Please consider PF ratio    Please clarify a diagnosis  based on the above clinical findings.     A) Acute Hypoxic Respiratory Failure  B) Acute Respiratory Distress Syndrome  C) No clinical indications of Respiratory Failure  D) Other ( Please specify condition)

## 2019-03-26 RX ADMIN — Medication 240 MILLIGRAM(S): at 05:15

## 2019-03-26 RX ADMIN — HEPARIN SODIUM 5000 UNIT(S): 5000 INJECTION INTRAVENOUS; SUBCUTANEOUS at 18:01

## 2019-03-26 RX ADMIN — SENNA PLUS 2 TABLET(S): 8.6 TABLET ORAL at 20:31

## 2019-03-26 RX ADMIN — HEPARIN SODIUM 5000 UNIT(S): 5000 INJECTION INTRAVENOUS; SUBCUTANEOUS at 05:16

## 2019-03-26 RX ADMIN — QUETIAPINE FUMARATE 12.5 MILLIGRAM(S): 200 TABLET, FILM COATED ORAL at 05:16

## 2019-03-26 RX ADMIN — SIMVASTATIN 10 MILLIGRAM(S): 20 TABLET, FILM COATED ORAL at 20:30

## 2019-03-26 RX ADMIN — Medication 250 MILLIGRAM(S): at 18:01

## 2019-03-26 RX ADMIN — CEFEPIME 100 MILLIGRAM(S): 1 INJECTION, POWDER, FOR SOLUTION INTRAMUSCULAR; INTRAVENOUS at 05:15

## 2019-03-26 RX ADMIN — QUETIAPINE FUMARATE 12.5 MILLIGRAM(S): 200 TABLET, FILM COATED ORAL at 18:01

## 2019-03-26 RX ADMIN — Medication 100 MICROGRAM(S): at 05:15

## 2019-03-26 RX ADMIN — Medication 250 MILLIGRAM(S): at 05:16

## 2019-03-26 RX ADMIN — AZITHROMYCIN 255 MILLIGRAM(S): 500 TABLET, FILM COATED ORAL at 12:16

## 2019-03-26 RX ADMIN — LOSARTAN POTASSIUM 50 MILLIGRAM(S): 100 TABLET, FILM COATED ORAL at 05:16

## 2019-03-26 RX ADMIN — CEFEPIME 100 MILLIGRAM(S): 1 INJECTION, POWDER, FOR SOLUTION INTRAMUSCULAR; INTRAVENOUS at 18:04

## 2019-03-26 RX ADMIN — DONEPEZIL HYDROCHLORIDE 10 MILLIGRAM(S): 10 TABLET, FILM COATED ORAL at 20:31

## 2019-03-26 NOTE — PROGRESS NOTE ADULT - SUBJECTIVE AND OBJECTIVE BOX
cc: cough, sob  · Subjective and Objective: 	  89 y.o. female with PMHx of Dementia, DVT off AC, ESBL UTIs, HTN, HLD, Hypothyroidism, OA s/p Left TKA sent from SNF due to cough and SOB - treated with po doxycycline w/o improvement. Pt is demented and unable to provide Hx. Feeling cold, bringing up brownish phlegm.  Pt admitted for RLL PNA and treating with IV abx.  Pt tolerating medications.  At bedside she offers no complaints.  Mentation at baseline.  Denies fever, chills, n, v, CP, sob.      3/25: Pt lying in bed, less alert today, but offers no complaints.   3/26: lying in bed, asleep but alert, pt telling me to "get out."    REVIEW OF SYSTEMS: All other review of systems is negative unless indicated above.    Vital Signs Last 24 Hrs  T(C): 37.3 (26 Mar 2019 11:21), Max: 37.4 (25 Mar 2019 22:30)  T(F): 99.2 (26 Mar 2019 11:21), Max: 99.3 (25 Mar 2019 22:30)  HR: 65 (26 Mar 2019 11:21) (62 - 65)  BP: 137/69 (26 Mar 2019 11:21) (122/73 - 150/78)  BP(mean): --  RR: 18 (26 Mar 2019 11:21) (18 - 18)  SpO2: 93% (26 Mar 2019 11:21) (93% - 94%)    PHYSICAL EXAM:  General: NAD, confused  Eyes: PERRLA, EOMI; conjunctiva and sclera clear  Head: Normocephalic; atraumatic  ENMT: moist mucosal membranes with dry lips  Neck: Supple; non tender; no masses  Respiratory: diffuse rhonchi with diminished BS at bases BL - improving   Cardiovascular: S1, S2 reg, + murmur  Gastrointestinal: Soft abd, NT, + BS  Genitourinary: No costovertebral angle tenderness  Extremities: No clubbing, cyanosis or edema  Vascular: Peripheral pulses palpable 2+ bilaterally  Neurological: Alert, disoriented in place and time, oriented in person  Skin: Warm and dry.   Musculoskeletal: Normal tone, without deformities    MEDICATIONS  (STANDING):  ALBUTerol    0.083% 2.5 milliGRAM(s) Nebulizer every 6 hours  ALBUTerol    90 MICROgram(s) HFA Inhaler 1 Puff(s) Inhalation every 4 hours  azithromycin  IVPB 500 milliGRAM(s) IV Intermittent every 24 hours  cefepime   IVPB 2000 milliGRAM(s) IV Intermittent every 12 hours  cefepime   IVPB      docusate sodium 100 milliGRAM(s) Oral daily  donepezil 10 milliGRAM(s) Oral at bedtime  heparin  Injectable 5000 Unit(s) SubCutaneous every 12 hours  levothyroxine 100 MICROGram(s) Oral daily  losartan 50 milliGRAM(s) Oral daily  memantine 10 milliGRAM(s) Oral daily  QUEtiapine 12.5 milliGRAM(s) Oral two times a day  saccharomyces boulardii 250 milliGRAM(s) Oral two times a day  senna 2 Tablet(s) Oral at bedtime  simvastatin 10 milliGRAM(s) Oral at bedtime  verapamil  milliGRAM(s) Oral daily    MEDICATIONS  (PRN):  ALPRAZolam 0.5 milliGRAM(s) Oral two times a day PRN anxiety    CAPILLARY BLOOD GLUCOSE    Assessment and Plan:  89 y.o. female with PMHx of Dementia, DVT off AC, ESBL UTIs, HTN, HLD, Hypothyroidism, OA s/p Left TKA sent from SNF due to cough and SOB - treated with po doxycycline w/o improvement.    SOB/cough with acute hypoxic respiratory failure: likely gram pos/neg bacteria PNA:   -IV abx empiric abx with cefepime and azithromycin for probable gram neg bacteria coverage day # 5, will continue given low grade fevers.  -nebs  -ID eval appreciated  -CT chest suggestive of RLL PNA.  -acute hypoxic respiratory failure resolved   -florastor    Hx of ESBL UTIs: No growth on UCx or blood cultures.     Hx of DVT:  - off AC,   -VTE proph UFH    Hypothyroidism - synthroid    HTN: stable  -Continue verapamil and cozaar    Dementia:  -Supportive care  -Fall precautions    Hypernatremia hypovolemic: Mild  -s/p IVFs  -declines further blood work    Dispo:  -d/c once medically stable and treated for PNA back to [facility] w/ home care.                                                        Electronic Signatures:  Tara Hartman)  (Signed 23-Mar-2019 13:07)  	Authored: Progress Note, Reason for Admission, Subjective and Objective      Last Updated: 23-Mar-2019 13:07 by Tara Hartman)

## 2019-03-27 ENCOUNTER — TRANSCRIPTION ENCOUNTER (OUTPATIENT)
Age: 84
End: 2019-03-27

## 2019-03-27 VITALS
OXYGEN SATURATION: 93 % | RESPIRATION RATE: 18 BRPM | DIASTOLIC BLOOD PRESSURE: 67 MMHG | HEART RATE: 65 BPM | SYSTOLIC BLOOD PRESSURE: 135 MMHG | TEMPERATURE: 99 F

## 2019-03-27 LAB
CULTURE RESULTS: SIGNIFICANT CHANGE UP
CULTURE RESULTS: SIGNIFICANT CHANGE UP
SPECIMEN SOURCE: SIGNIFICANT CHANGE UP
SPECIMEN SOURCE: SIGNIFICANT CHANGE UP

## 2019-03-27 RX ORDER — LOSARTAN POTASSIUM 100 MG/1
1 TABLET, FILM COATED ORAL
Qty: 0 | Refills: 0 | DISCHARGE
Start: 2019-03-27

## 2019-03-27 RX ADMIN — CEFEPIME 100 MILLIGRAM(S): 1 INJECTION, POWDER, FOR SOLUTION INTRAMUSCULAR; INTRAVENOUS at 12:19

## 2019-03-27 RX ADMIN — Medication 240 MILLIGRAM(S): at 05:50

## 2019-03-27 RX ADMIN — ALBUTEROL 2.5 MILLIGRAM(S): 90 AEROSOL, METERED ORAL at 08:03

## 2019-03-27 RX ADMIN — Medication 100 MICROGRAM(S): at 05:50

## 2019-03-27 RX ADMIN — QUETIAPINE FUMARATE 12.5 MILLIGRAM(S): 200 TABLET, FILM COATED ORAL at 05:50

## 2019-03-27 RX ADMIN — LOSARTAN POTASSIUM 50 MILLIGRAM(S): 100 TABLET, FILM COATED ORAL at 05:50

## 2019-03-27 RX ADMIN — HEPARIN SODIUM 5000 UNIT(S): 5000 INJECTION INTRAVENOUS; SUBCUTANEOUS at 05:49

## 2019-03-27 RX ADMIN — Medication 250 MILLIGRAM(S): at 05:50

## 2019-03-27 RX ADMIN — CEFEPIME 100 MILLIGRAM(S): 1 INJECTION, POWDER, FOR SOLUTION INTRAMUSCULAR; INTRAVENOUS at 05:49

## 2019-03-27 NOTE — PROGRESS NOTE ADULT - ASSESSMENT
89 y.o. female with PMHx of Dementia, DVT off AC, ESBL UTIs, HTN, HLD, Hypothyroidism, OA s/p Left TKA sent from SNF due to cough and SOB - treated with po doxycycline w/o improvement. Pt is demented and unable to provide Hx. Feeling cold, bringing up brownish phlegm here afebrile, wbc ct nl xray vascular congestion, atelectasis R mid ling/LLL, UA not remarkable, was given IV meropenem/vanco.    1. cough. sob. pneumonia. alzheimers dementia  - slowly improving  - urine cx/blood cx no growth   - on cefepime/azithromycin #3  - plan for 3 days azithro  - on dc plan for po ceftin 500mg bid complete 7 day course  - monitor temps  - tolerating abx well so far; no side effects noted  - reason for abx use and side effects reviewed with patient  - supportive care  - fu cbc    2. other issues - care per medicine
89 y.o. female with PMHx of Dementia, DVT off AC, ESBL UTIs, HTN, HLD, Hypothyroidism, OA s/p Left TKA sent from SNF due to cough and SOB - treated with po doxycycline w/o improvement. Pt is demented and unable to provide Hx. Feeling cold, bringing up brownish phlegm here afebrile, wbc ct nl xray vascular congestion, atelectasis R mid ling/LLL, UA not remarkable, was given IV meropenem/vanco.    1. cough. sob. pneumonia. alzheimers dementia  - slowly improving  - urine cx/blood cx no growth   - on cefepime #4  - s/p azithromycin #3  - on dc plan for po ceftin 500mg bid complete 7 day course  - monitor temps  - tolerating abx well so far; no side effects noted  - reason for abx use and side effects reviewed with patient  - supportive care  - fu cbc    2. other issues - care per medicine

## 2019-03-27 NOTE — DISCHARGE NOTE PROVIDER - HOSPITAL COURSE
cc: cough, sob    Subjective and Objective:     89 y.o. female with PMHx of Dementia, DVT off AC, ESBL UTIs, HTN, HLD, Hypothyroidism, OA s/p Left TKA sent from SNF due to cough and SOB - treated with po doxycycline w/o improvement. Pt is demented and unable to provide Hx. Feeling cold, bringing up brownish phlegm.  Pt admitted for RLL PNA and treating with IV abx.  Pt tolerating medications.  At bedside she offers no complaints.  Mentation at baseline.  Denies fever, chills, n, v, CP, sob.   Pt's breathing back to baseline, she remains HD stable, afebrile.  She is minimally verbal at baseline.  Mentation status quo.  She received 6 days of IV antibiotics for RLL pneumonia.  Cough improved.        REVIEW OF SYSTEMS: All other review of systems is negative unless indicated above.        Vital Signs Last 24 Hrs    T(C): 37 (27 Mar 2019 05:56), Max: 37.3 (26 Mar 2019 11:21)    T(F): 98.6 (27 Mar 2019 05:56), Max: 99.2 (26 Mar 2019 11:21)    HR: 60 (27 Mar 2019 08:00) (60 - 66)    BP: 158/85 (27 Mar 2019 05:56) (137/69 - 158/85)    BP(mean): --    RR: 18 (27 Mar 2019 05:56) (18 - 18)    SpO2: 93% (27 Mar 2019 05:56) (93% - 96%)        General: NAD, confused    Eyes: PERRLA, EOMI; conjunctiva and sclera clear    Head: Normocephalic; atraumatic    ENMT: moist mucosal membranes with dry lips    Neck: Supple; non tender; no masses    Respiratory: diffuse rhonchi with diminished BS at bases BL - improving     Cardiovascular: S1, S2 reg, + murmur    Gastrointestinal: Soft abd, NT, + BS    Genitourinary: No costovertebral angle tenderness    Extremities: No clubbing, cyanosis or edema    Neurological: Alert, disoriented in place and time, oriented in person    Skin: Warm and dry.         meds/labs: Reviewed        Assessment and Plan:  89 y.o. female with PMHx of Dementia, DVT off AC, ESBL UTIs, HTN, HLD, Hypothyroidism, OA s/p Left TKA sent from SNF due to cough and SOB - treated with po doxycycline w/o improvement.        SOB/cough with acute hypoxic respiratory failure: likely gram pos/neg bacteria PNA:     -IV abx empiric abx with cefepime and azithromycin for probable gram neg bacteria coverage day # 6.    -CT chest suggestive of RLL PNA.    -acute hypoxic respiratory failure resolved        Hx of ESBL UTIs: No growth on UCx or blood cultures.         Hx of DVT: off AC        Hypothyroidism - synthroid        HTN: stable - Continue verapamil and cozaar        Dementia:    -Supportive care    -Fall precautions        Hypernatremia hypovolemic: Mild    -s/p IVFs    -declines further blood work        Dispo: d/c to [facility] w/ home care.
(0) independent

## 2019-03-27 NOTE — DISCHARGE NOTE PROVIDER - NSDCCPCAREPLAN_GEN_ALL_CORE_FT
PRINCIPAL DISCHARGE DIAGNOSIS  Diagnosis: Pneumonia  Assessment and Plan of Treatment: resolution of symptoms. you completed 6 days of antibiotics IV.      SECONDARY DISCHARGE DIAGNOSES  Diagnosis: Chronic dementia  Assessment and Plan of Treatment: continue supportive care.

## 2019-03-27 NOTE — PROGRESS NOTE ADULT - SUBJECTIVE AND OBJECTIVE BOX
Date of service: 03-27-19 @ 12:43    pt seen and examined  no resp distress  feels better    ROS: no fever or chills; denies dizziness, no HA, no abdominal pain, no diarrhea or constipation; no dysuria, no urinary frequency, no legs pain, no rashes      MEDICATIONS  (STANDING):  ALBUTerol    0.083% 2.5 milliGRAM(s) Nebulizer every 6 hours  ALBUTerol    90 MICROgram(s) HFA Inhaler 1 Puff(s) Inhalation every 4 hours  cefepime   IVPB 2000 milliGRAM(s) IV Intermittent every 12 hours  cefepime   IVPB      docusate sodium 100 milliGRAM(s) Oral daily  donepezil 10 milliGRAM(s) Oral at bedtime  heparin  Injectable 5000 Unit(s) SubCutaneous every 12 hours  levothyroxine 100 MICROGram(s) Oral daily  losartan 50 milliGRAM(s) Oral daily  memantine 10 milliGRAM(s) Oral daily  QUEtiapine 12.5 milliGRAM(s) Oral two times a day  saccharomyces boulardii 250 milliGRAM(s) Oral two times a day  senna 2 Tablet(s) Oral at bedtime  simvastatin 10 milliGRAM(s) Oral at bedtime  verapamil  milliGRAM(s) Oral daily    Vital Signs Last 24 Hrs  T(C): 37.2 (27 Mar 2019 11:37), Max: 37.2 (27 Mar 2019 11:37)  T(F): 99 (27 Mar 2019 11:37), Max: 99 (27 Mar 2019 11:37)  HR: 65 (27 Mar 2019 11:37) (60 - 66)  BP: 135/67 (27 Mar 2019 11:37) (135/67 - 158/85)  BP(mean): --  RR: 18 (27 Mar 2019 11:37) (18 - 18)  SpO2: 93% (27 Mar 2019 11:37) (93% - 96%)      PE:  Constitutional: frail looking  HEENT: NC/AT, EOMI, PERRLA, conjunctivae clear; ears and nose atraumatic; pharynx benign  Neck: supple; thyroid not palpable  Back: no tenderness  Respiratory: decreased breath sounds  Cardiovascular: S1S2 regular, no murmurs  Abdomen: soft, not tender, not distended, positive BS; liver and spleen WNL  Genitourinary: no suprapubic tenderness  Lymphatic: no LN palpable  Musculoskeletal: no muscle tenderness, no joint swelling or tenderness  Extremities: no pedal edema  Neurological/ Psychiatric:  moving all extremities  Skin: no rashes; no palpable lesions    Labs: all available labs reviewed                 Cultures:   Culture - Urine (03.22.19 @ 18:00)    Specimen Source: .Urine Catheterized    Culture Results:   No growth    Culture - Blood (03.22.19 @ 13:46)    Specimen Source: .Blood None    Culture Results:   No growth to date.              Radiology: all available radiological tests reviewed    EXAM:  XR CHEST PORTABLE IMMED 1V                            PROCEDURE DATE:  03/22/2019          INTERPRETATION:  History: Chest pain    Chest:  one view.      Comparison: 12/29/2016    AP radiograph of the chest demonstrates mild vascular congestion and   subsegmental atelectasis in the RIGHT midlung and LEFT lower lobe. The   cardiac silhouette is normal in size. Osseous structures are intact.    Impression:mild vascular congestion and subsegmental atelectasis in the   RIGHT midlung and LEFTlower lobe      Advanced directives addressed: full resuscitation

## 2019-03-27 NOTE — DISCHARGE NOTE NURSING/CASE MANAGEMENT/SOCIAL WORK - NSDCDPATPORTLINK_GEN_ALL_CORE
You can access the [facility] Portal, offered by [facility], by registering with the following website: [email or web address]

## 2019-04-01 DIAGNOSIS — R06.02 SHORTNESS OF BREATH: ICD-10-CM

## 2019-04-01 DIAGNOSIS — J15.6 PNEUMONIA DUE TO OTHER GRAM-NEGATIVE BACTERIA: ICD-10-CM

## 2019-04-01 DIAGNOSIS — E87.0 HYPEROSMOLALITY AND HYPERNATREMIA: ICD-10-CM

## 2019-04-01 DIAGNOSIS — I10 ESSENTIAL (PRIMARY) HYPERTENSION: ICD-10-CM

## 2019-04-01 DIAGNOSIS — G30.9 ALZHEIMER'S DISEASE, UNSPECIFIED: ICD-10-CM

## 2019-04-01 DIAGNOSIS — E78.5 HYPERLIPIDEMIA, UNSPECIFIED: ICD-10-CM

## 2019-04-01 DIAGNOSIS — Z96.652 PRESENCE OF LEFT ARTIFICIAL KNEE JOINT: ICD-10-CM

## 2019-04-01 DIAGNOSIS — J15.9 UNSPECIFIED BACTERIAL PNEUMONIA: ICD-10-CM

## 2019-04-01 DIAGNOSIS — J96.01 ACUTE RESPIRATORY FAILURE WITH HYPOXIA: ICD-10-CM

## 2019-04-01 DIAGNOSIS — F02.80 DEMENTIA IN OTHER DISEASES CLASSIFIED ELSEWHERE, UNSPECIFIED SEVERITY, WITHOUT BEHAVIORAL DISTURBANCE, PSYCHOTIC DISTURBANCE, MOOD DISTURBANCE, AND ANXIETY: ICD-10-CM

## 2019-04-01 DIAGNOSIS — E03.9 HYPOTHYROIDISM, UNSPECIFIED: ICD-10-CM

## 2019-04-01 DIAGNOSIS — Z86.718 PERSONAL HISTORY OF OTHER VENOUS THROMBOSIS AND EMBOLISM: ICD-10-CM

## 2020-02-28 ENCOUNTER — INPATIENT (INPATIENT)
Facility: HOSPITAL | Age: 85
LOS: 5 days | Discharge: NURSING, REHAB OR LONG-TERM CARE | DRG: 193 | End: 2020-03-05
Attending: FAMILY MEDICINE | Admitting: FAMILY MEDICINE
Payer: MEDICARE

## 2020-02-28 VITALS
SYSTOLIC BLOOD PRESSURE: 139 MMHG | DIASTOLIC BLOOD PRESSURE: 91 MMHG | TEMPERATURE: 98 F | RESPIRATION RATE: 16 BRPM | OXYGEN SATURATION: 80 % | WEIGHT: 160.06 LBS | HEART RATE: 73 BPM

## 2020-02-28 DIAGNOSIS — J18.9 PNEUMONIA, UNSPECIFIED ORGANISM: ICD-10-CM

## 2020-02-28 DIAGNOSIS — Z96.652 PRESENCE OF LEFT ARTIFICIAL KNEE JOINT: Chronic | ICD-10-CM

## 2020-02-28 DIAGNOSIS — Z98.42 CATARACT EXTRACTION STATUS, LEFT EYE: Chronic | ICD-10-CM

## 2020-02-28 LAB
ALBUMIN SERPL ELPH-MCNC: 3.1 G/DL — LOW (ref 3.3–5)
ALP SERPL-CCNC: 86 U/L — SIGNIFICANT CHANGE UP (ref 40–120)
ALT FLD-CCNC: 18 U/L — SIGNIFICANT CHANGE UP (ref 12–78)
ANION GAP SERPL CALC-SCNC: 5 MMOL/L — SIGNIFICANT CHANGE UP (ref 5–17)
APPEARANCE UR: CLEAR — SIGNIFICANT CHANGE UP
APTT BLD: 29.3 SEC — SIGNIFICANT CHANGE UP (ref 27.5–36.3)
AST SERPL-CCNC: 19 U/L — SIGNIFICANT CHANGE UP (ref 15–37)
BASE EXCESS BLDV CALC-SCNC: 0.6 MMOL/L — SIGNIFICANT CHANGE UP (ref -2–2)
BASOPHILS # BLD AUTO: 0.03 K/UL — SIGNIFICANT CHANGE UP (ref 0–0.2)
BASOPHILS NFR BLD AUTO: 0.3 % — SIGNIFICANT CHANGE UP (ref 0–2)
BILIRUB SERPL-MCNC: 0.3 MG/DL — SIGNIFICANT CHANGE UP (ref 0.2–1.2)
BILIRUB UR-MCNC: NEGATIVE — SIGNIFICANT CHANGE UP
BUN SERPL-MCNC: 34 MG/DL — HIGH (ref 7–23)
CALCIUM SERPL-MCNC: 9.9 MG/DL — SIGNIFICANT CHANGE UP (ref 8.5–10.1)
CHLORIDE SERPL-SCNC: 111 MMOL/L — HIGH (ref 96–108)
CO2 SERPL-SCNC: 28 MMOL/L — SIGNIFICANT CHANGE UP (ref 22–31)
COLOR SPEC: YELLOW — SIGNIFICANT CHANGE UP
CREAT SERPL-MCNC: 1.42 MG/DL — HIGH (ref 0.5–1.3)
DIFF PNL FLD: ABNORMAL
EOSINOPHIL # BLD AUTO: 0.12 K/UL — SIGNIFICANT CHANGE UP (ref 0–0.5)
EOSINOPHIL NFR BLD AUTO: 1 % — SIGNIFICANT CHANGE UP (ref 0–6)
GLUCOSE SERPL-MCNC: 112 MG/DL — HIGH (ref 70–99)
GLUCOSE UR QL: NEGATIVE MG/DL — SIGNIFICANT CHANGE UP
HCO3 BLDV-SCNC: 28 MMOL/L — SIGNIFICANT CHANGE UP (ref 21–29)
HCOV PNL SPEC NAA+PROBE: DETECTED
HCT VFR BLD CALC: 45.6 % — HIGH (ref 34.5–45)
HGB BLD-MCNC: 14.3 G/DL — SIGNIFICANT CHANGE UP (ref 11.5–15.5)
IMM GRANULOCYTES NFR BLD AUTO: 0.8 % — SIGNIFICANT CHANGE UP (ref 0–1.5)
INR BLD: 1.1 RATIO — SIGNIFICANT CHANGE UP (ref 0.88–1.16)
KETONES UR-MCNC: NEGATIVE — SIGNIFICANT CHANGE UP
LACTATE SERPL-SCNC: 1.4 MMOL/L — SIGNIFICANT CHANGE UP (ref 0.7–2)
LEUKOCYTE ESTERASE UR-ACNC: ABNORMAL
LYMPHOCYTES # BLD AUTO: 1.76 K/UL — SIGNIFICANT CHANGE UP (ref 1–3.3)
LYMPHOCYTES # BLD AUTO: 15 % — SIGNIFICANT CHANGE UP (ref 13–44)
MCHC RBC-ENTMCNC: 31.4 GM/DL — LOW (ref 32–36)
MCHC RBC-ENTMCNC: 31.7 PG — SIGNIFICANT CHANGE UP (ref 27–34)
MCV RBC AUTO: 101.1 FL — HIGH (ref 80–100)
MONOCYTES # BLD AUTO: 0.51 K/UL — SIGNIFICANT CHANGE UP (ref 0–0.9)
MONOCYTES NFR BLD AUTO: 4.4 % — SIGNIFICANT CHANGE UP (ref 2–14)
NEUTROPHILS # BLD AUTO: 9.19 K/UL — HIGH (ref 1.8–7.4)
NEUTROPHILS NFR BLD AUTO: 78.5 % — HIGH (ref 43–77)
NITRITE UR-MCNC: POSITIVE
PCO2 BLDV: 63 MMHG — HIGH (ref 35–50)
PH BLDV: 7.28 — LOW (ref 7.35–7.45)
PH UR: 5 — SIGNIFICANT CHANGE UP (ref 5–8)
PLATELET # BLD AUTO: 203 K/UL — SIGNIFICANT CHANGE UP (ref 150–400)
PO2 BLDV: 86 MMHG — HIGH (ref 25–45)
POTASSIUM SERPL-MCNC: 4.1 MMOL/L — SIGNIFICANT CHANGE UP (ref 3.5–5.3)
POTASSIUM SERPL-SCNC: 4.1 MMOL/L — SIGNIFICANT CHANGE UP (ref 3.5–5.3)
PROT SERPL-MCNC: 7.8 GM/DL — SIGNIFICANT CHANGE UP (ref 6–8.3)
PROT UR-MCNC: 15 MG/DL
PROTHROM AB SERPL-ACNC: 12.2 SEC — SIGNIFICANT CHANGE UP (ref 10–12.9)
RAPID RVP RESULT: DETECTED
RBC # BLD: 4.51 M/UL — SIGNIFICANT CHANGE UP (ref 3.8–5.2)
RBC # FLD: 12.6 % — SIGNIFICANT CHANGE UP (ref 10.3–14.5)
SAO2 % BLDV: 94 % — HIGH (ref 67–88)
SODIUM SERPL-SCNC: 144 MMOL/L — SIGNIFICANT CHANGE UP (ref 135–145)
SP GR SPEC: 1.02 — SIGNIFICANT CHANGE UP (ref 1.01–1.02)
UROBILINOGEN FLD QL: NEGATIVE MG/DL — SIGNIFICANT CHANGE UP
WBC # BLD: 11.7 K/UL — HIGH (ref 3.8–10.5)
WBC # FLD AUTO: 11.7 K/UL — HIGH (ref 3.8–10.5)

## 2020-02-28 PROCEDURE — 99223 1ST HOSP IP/OBS HIGH 75: CPT | Mod: AI

## 2020-02-28 PROCEDURE — 36415 COLL VENOUS BLD VENIPUNCTURE: CPT

## 2020-02-28 PROCEDURE — 85025 COMPLETE CBC W/AUTO DIFF WBC: CPT

## 2020-02-28 PROCEDURE — 80048 BASIC METABOLIC PNL TOTAL CA: CPT

## 2020-02-28 PROCEDURE — 92610 EVALUATE SWALLOWING FUNCTION: CPT | Mod: GN

## 2020-02-28 PROCEDURE — 71045 X-RAY EXAM CHEST 1 VIEW: CPT | Mod: 26

## 2020-02-28 PROCEDURE — 94640 AIRWAY INHALATION TREATMENT: CPT

## 2020-02-28 PROCEDURE — 93306 TTE W/DOPPLER COMPLETE: CPT

## 2020-02-28 PROCEDURE — 92523 SPEECH SOUND LANG COMPREHEN: CPT | Mod: GN

## 2020-02-28 PROCEDURE — 71260 CT THORAX DX C+: CPT | Mod: 26

## 2020-02-28 PROCEDURE — 85027 COMPLETE CBC AUTOMATED: CPT

## 2020-02-28 RX ORDER — QUETIAPINE FUMARATE 200 MG/1
12.5 TABLET, FILM COATED ORAL AT BEDTIME
Refills: 0 | Status: DISCONTINUED | OUTPATIENT
Start: 2020-02-28 | End: 2020-03-05

## 2020-02-28 RX ORDER — MEMANTINE HYDROCHLORIDE 10 MG/1
5 TABLET ORAL
Refills: 0 | Status: DISCONTINUED | OUTPATIENT
Start: 2020-02-28 | End: 2020-03-05

## 2020-02-28 RX ORDER — LEVOTHYROXINE SODIUM 125 MCG
100 TABLET ORAL DAILY
Refills: 0 | Status: DISCONTINUED | OUTPATIENT
Start: 2020-02-28 | End: 2020-03-05

## 2020-02-28 RX ORDER — LACTULOSE 10 G/15ML
10 SOLUTION ORAL
Refills: 0 | Status: DISCONTINUED | OUTPATIENT
Start: 2020-02-28 | End: 2020-03-05

## 2020-02-28 RX ORDER — AZITHROMYCIN 500 MG/1
500 TABLET, FILM COATED ORAL ONCE
Refills: 0 | Status: COMPLETED | OUTPATIENT
Start: 2020-02-28 | End: 2020-02-28

## 2020-02-28 RX ORDER — ACETAMINOPHEN 500 MG
650 TABLET ORAL EVERY 6 HOURS
Refills: 0 | Status: DISCONTINUED | OUTPATIENT
Start: 2020-02-28 | End: 2020-03-05

## 2020-02-28 RX ORDER — AZITHROMYCIN 500 MG/1
TABLET, FILM COATED ORAL
Refills: 0 | Status: DISCONTINUED | OUTPATIENT
Start: 2020-02-28 | End: 2020-03-05

## 2020-02-28 RX ORDER — VANCOMYCIN HCL 1 G
1000 VIAL (EA) INTRAVENOUS ONCE
Refills: 0 | Status: COMPLETED | OUTPATIENT
Start: 2020-02-28 | End: 2020-02-28

## 2020-02-28 RX ORDER — IPRATROPIUM/ALBUTEROL SULFATE 18-103MCG
3 AEROSOL WITH ADAPTER (GRAM) INHALATION ONCE
Refills: 0 | Status: COMPLETED | OUTPATIENT
Start: 2020-02-28 | End: 2020-02-28

## 2020-02-28 RX ORDER — CEFEPIME 1 G/1
1000 INJECTION, POWDER, FOR SOLUTION INTRAMUSCULAR; INTRAVENOUS
Refills: 0 | Status: DISCONTINUED | OUTPATIENT
Start: 2020-02-28 | End: 2020-03-05

## 2020-02-28 RX ORDER — ALBUTEROL 90 UG/1
2 AEROSOL, METERED ORAL EVERY 6 HOURS
Refills: 0 | Status: DISCONTINUED | OUTPATIENT
Start: 2020-02-28 | End: 2020-03-05

## 2020-02-28 RX ORDER — CEFEPIME 1 G/1
1000 INJECTION, POWDER, FOR SOLUTION INTRAMUSCULAR; INTRAVENOUS DAILY
Refills: 0 | Status: DISCONTINUED | OUTPATIENT
Start: 2020-02-28 | End: 2020-02-28

## 2020-02-28 RX ORDER — IPRATROPIUM/ALBUTEROL SULFATE 18-103MCG
3 AEROSOL WITH ADAPTER (GRAM) INHALATION EVERY 6 HOURS
Refills: 0 | Status: DISCONTINUED | OUTPATIENT
Start: 2020-02-28 | End: 2020-03-05

## 2020-02-28 RX ORDER — CHOLECALCIFEROL (VITAMIN D3) 125 MCG
1000 CAPSULE ORAL DAILY
Refills: 0 | Status: DISCONTINUED | OUTPATIENT
Start: 2020-02-28 | End: 2020-03-05

## 2020-02-28 RX ORDER — CEFEPIME 1 G/1
2000 INJECTION, POWDER, FOR SOLUTION INTRAMUSCULAR; INTRAVENOUS ONCE
Refills: 0 | Status: COMPLETED | OUTPATIENT
Start: 2020-02-28 | End: 2020-02-28

## 2020-02-28 RX ORDER — DONEPEZIL HYDROCHLORIDE 10 MG/1
10 TABLET, FILM COATED ORAL AT BEDTIME
Refills: 0 | Status: DISCONTINUED | OUTPATIENT
Start: 2020-02-28 | End: 2020-03-05

## 2020-02-28 RX ORDER — HEPARIN SODIUM 5000 [USP'U]/ML
5000 INJECTION INTRAVENOUS; SUBCUTANEOUS EVERY 12 HOURS
Refills: 0 | Status: DISCONTINUED | OUTPATIENT
Start: 2020-02-28 | End: 2020-03-05

## 2020-02-28 RX ORDER — SENNA PLUS 8.6 MG/1
2 TABLET ORAL AT BEDTIME
Refills: 0 | Status: DISCONTINUED | OUTPATIENT
Start: 2020-02-28 | End: 2020-03-05

## 2020-02-28 RX ORDER — ALPRAZOLAM 0.25 MG
0.5 TABLET ORAL
Refills: 0 | Status: DISCONTINUED | OUTPATIENT
Start: 2020-02-28 | End: 2020-03-05

## 2020-02-28 RX ORDER — LOSARTAN POTASSIUM 100 MG/1
50 TABLET, FILM COATED ORAL DAILY
Refills: 0 | Status: DISCONTINUED | OUTPATIENT
Start: 2020-02-28 | End: 2020-02-28

## 2020-02-28 RX ORDER — AZITHROMYCIN 500 MG/1
500 TABLET, FILM COATED ORAL EVERY 24 HOURS
Refills: 0 | Status: DISCONTINUED | OUTPATIENT
Start: 2020-02-29 | End: 2020-03-05

## 2020-02-28 RX ORDER — VERAPAMIL HCL 240 MG
240 CAPSULE, EXTENDED RELEASE PELLETS 24 HR ORAL DAILY
Refills: 0 | Status: DISCONTINUED | OUTPATIENT
Start: 2020-02-28 | End: 2020-03-05

## 2020-02-28 RX ORDER — FAMOTIDINE 10 MG/ML
20 INJECTION INTRAVENOUS DAILY
Refills: 0 | Status: DISCONTINUED | OUTPATIENT
Start: 2020-02-28 | End: 2020-03-05

## 2020-02-28 RX ORDER — SIMVASTATIN 20 MG/1
10 TABLET, FILM COATED ORAL AT BEDTIME
Refills: 0 | Status: DISCONTINUED | OUTPATIENT
Start: 2020-02-28 | End: 2020-03-05

## 2020-02-28 RX ORDER — LORATADINE 10 MG/1
10 TABLET ORAL DAILY
Refills: 0 | Status: DISCONTINUED | OUTPATIENT
Start: 2020-02-28 | End: 2020-03-05

## 2020-02-28 RX ORDER — ACETAMINOPHEN 500 MG
650 TABLET ORAL ONCE
Refills: 0 | Status: DISCONTINUED | OUTPATIENT
Start: 2020-02-28 | End: 2020-02-28

## 2020-02-28 RX ORDER — FLUTICASONE PROPIONATE 50 MCG
1 SPRAY, SUSPENSION NASAL DAILY
Refills: 0 | Status: DISCONTINUED | OUTPATIENT
Start: 2020-02-28 | End: 2020-03-05

## 2020-02-28 RX ADMIN — AZITHROMYCIN 255 MILLIGRAM(S): 500 TABLET, FILM COATED ORAL at 22:20

## 2020-02-28 RX ADMIN — Medication 1000 MILLIGRAM(S): at 19:05

## 2020-02-28 RX ADMIN — CEFEPIME 2000 MILLIGRAM(S): 1 INJECTION, POWDER, FOR SOLUTION INTRAMUSCULAR; INTRAVENOUS at 17:31

## 2020-02-28 RX ADMIN — Medication 40 MILLIGRAM(S): at 22:21

## 2020-02-28 RX ADMIN — CEFEPIME 100 MILLIGRAM(S): 1 INJECTION, POWDER, FOR SOLUTION INTRAMUSCULAR; INTRAVENOUS at 15:40

## 2020-02-28 RX ADMIN — Medication 3 MILLILITER(S): at 21:55

## 2020-02-28 RX ADMIN — Medication 250 MILLIGRAM(S): at 17:31

## 2020-02-28 RX ADMIN — Medication 125 MILLIGRAM(S): at 15:41

## 2020-02-28 RX ADMIN — Medication 3 MILLILITER(S): at 15:41

## 2020-02-28 RX ADMIN — Medication 3 MILLILITER(S): at 18:08

## 2020-02-28 NOTE — H&P ADULT - ATTENDING COMMENTS
Pt seen and examined at bedside with PGY III, Family Medicine , Dr. Diana Ornelas.  Presentation, diagnostic data and assessment and plan reviewed in detail.  Agree with plan of care as above.    Pt is a 89 y/o F w/ PMH of HTN, hypothyroidism, HLD, allergic rhinitis, chronic pain, OA, constipation, vit D deficiency, osteoporosis, GERD, COPD, dementia, anxiety, dysphagia admitted with Comm Acq Pna, RVP positive for Coronavirus,  acute on chronic COPD exac, and either CKD III or acute on CKD.  - cont CEfepime and Zithromax  - follow blood cx , u cx and clinical course  - asp precautions and swallow eval  - cont nebs, solumedrol  - Chest PT   - hold losartan due to creatinine and avoid nephrotoxic meds  - follow renal function

## 2020-02-28 NOTE — ED ADULT NURSE NOTE - OBJECTIVE STATEMENT
Patient comes to ED for abnormal chest xray. Patient is confused, poor historian. Patient came to ED with results from chest  XRAY. pt on arrival has wheezing/ rhonchi. Patient afebrile on arrival. Patient has a hx of HTN, hyptothyroid, HLD, GERD, COPD, dementia. Patient denies any pain or discomfort. pt skin intact

## 2020-02-28 NOTE — ED PROVIDER NOTE - CARE PLAN
Principal Discharge DX:	HCAP (healthcare-associated pneumonia)  Secondary Diagnosis:	Coronavirus infection

## 2020-02-28 NOTE — ED PROVIDER NOTE - OBJECTIVE STATEMENT
89 yo female with a PMH of copd, gerd, htn, hld, hypothyroid, OA, dementia, anxiety was BIBA from [facility] for cough and wheezing x few days. Per Chen RN from the facility noticed that she had these symptoms and spoke with her doctor who sent her for a CXR which showed b/l perihilar opacities and placed on steroids. Symptoms persisted so pt was sent to the ER for further evaluation.   Pt with h/o dementia and unable to obtain an accurate story from her.

## 2020-02-28 NOTE — H&P ADULT - CARDIOVASCULAR DETAILS
+ systolic murmur (could not assess where best heard due to pt wheezing)/positive S1/positive S2/murmur

## 2020-02-28 NOTE — ED PROVIDER NOTE - ENMT, MLM
Airway patent, Nasal mucosa clear. Mouth with dry mucosa and poor dentition. Throat has no vesicles, no oropharyngeal exudates and uvula is midline.

## 2020-02-28 NOTE — ED PROVIDER NOTE - PROGRESS NOTE DETAILS
Cleopatra Owens for attending Dr. Galvan: 89 y/o female with a PMHx of Chronic obstructive pulmonary disease, Hyperlipidemia, Hypertension, presents to the ED c/o cough and diffuse wheezing. Pt state that she "feels fine". Declined to answer questions pertinent to HPI at this time. No home oxygen dependence, requiring nasal cannula at this time. Never smoker. No other complaints at this time. Plan: Plan: medications, labs, EKG, CT scan of chest, and XR. Pt with pna, wheezing and sats in low 90s.  Dosed with abx for hcap coverage.  No increased WOB.  No accessory muscle usage.  Will require admission for pna and copd.  Further care per inpatient treatment team.

## 2020-02-28 NOTE — ED ADULT NURSE REASSESSMENT NOTE - NS ED NURSE REASSESS COMMENT FT1
Patint O2 sat on 4L nasal cannula 86%. Patient placed on venti mask 8L, O2 sat 88%. Patient given duoneb will reassess

## 2020-02-28 NOTE — ED PROVIDER NOTE - CPE EDP RESP NORM
Fernando called he is upset with [facility] and would like his script to be sent to [facility] in [place] instead.    - - -

## 2020-02-28 NOTE — ED PROVIDER NOTE - ATTENDING CONTRIBUTION TO CARE
I evaluated patient in concert with PA and agree with PA's hpi/physical exam/ros and assessment and plan.

## 2020-02-28 NOTE — H&P ADULT - ASSESSMENT
89F w/ PMH as above sent in for concern for PNA, being admitted for acute hypoxic respiratory failure due to CAP.    # Acute hypoxic respiratory failure 2/2 viral PNA and superimposed bacterial CAP and COPD exacerbation  Admit to med-surg  Should treat as CAP - no recent abx use, no recent hospitalization as per NH staff  RVP positive for coronavirus  s/p 1x vanco and cefepime, 1x solumedrol  Continue cefepime, added azithromycin  Duonebs standing  Albuterol q6 PRN  Started Symbicort  Continue supplemental O2 to keep saturation >90%    #HTN  Continue home meds - verapamil, losartan    #HLD  Continue simvastatin    # KLAUDIA vs CKD  NH documentation does not mention CKD  Trend BMP    # Hypothyroidism  Continue home dose synthroid 100    # GERD  Decreased H2 blocker dose for decreased GFR once daily    # Dementia/anxiety  Continue home meds - seroquel, memantine, xanax    # DVT ppx  HSQ q12, SCDs    IMPROVE VTE Individual Risk Assessment    RISK                                                                Points    [  ] Previous VTE                                                  3    [  ] Thrombophilia                                               2    [  ] Lower limb paralysis                                      2        (unable to hold up >15 seconds)      [  ] Current Cancer                                              2         (within 6 months)    [ x ] Immobilization > 24 hrs                                1    [  ] ICU/CCU stay > 24 hours                              1    [ x ] Age > 60                                                      1    IMPROVE VTE Score ______2___    IMPROVE Score 0-1: Low Risk, No VTE prophylaxis required for most patients, encourage ambulation.   IMPROVE Score 2-3: At risk, pharmacologic VTE prophylaxis is indicated for most patients (in the absence of a contraindication)  IMPROVE Score > or = 4: High Risk, pharmacologic VTE prophylaxis is indicated for most patients (in the absence of a contraindication)    # Advanced directives  FULL CODE  MOLST COMPLETE    ** COULD NOT GET PT'S SURGICAL HX OR FAMILY HX - INFO NOT IN CHART AND NH DID NOT HAVE THAT INFORMATION, REQUESTED WE CALL AGAIN 2/29/20 IN THE AM ** 89F w/ PMH as above sent in for concern for PNA, being admitted for acute hypoxic respiratory failure due to viral PNA/CAP causing COPD exacerbation.    # Acute hypoxic respiratory failure 2/2 viral PNA and superimposed bacterial CAP and COPD exacerbation  Admit to med-surg  Should treat as CAP - no recent abx use, no recent travel or exposure to anyone with recent travel, no recent hospitalization as per NH staff  Lactate normal 1.4  f/u Bcx x2  RVP positive for coronavirus  s/p 1x vanco and cefepime, 1x solumedrol  Continue cefepime, added azithromycin  Duonebs standing  Albuterol q6 PRN  Solumedrol 40 q8  Continue supplemental O2 to keep saturation >90%    # UTI  Pt not complaining of lower abdominal pain  UA showed + nitrites and too many bacteria to count  Cefepime should cover  f/u Ucx    #HTN  Not well-controlled - SBP in 140-150s, DBP in 100s  Continue home meds - verapamil, losartan    #HLD  Continue home meds- simvastatin    # Hx of dysphagia and choking with eating  f/u speech/swallow eval    #Heart murmur  Could not appreciated where best heard or quality bc of patient wheezing.  With enlarged cardiac silhouette on imaging  f/u TTE    # KLAUDIA vs CKD  NH documentation does not mention CKD  Trend BMP  Renal dosing medication  Avoid nephrotoxic medications    # Hypothyroidism  Continue home dose synthroid 100    # Ascending aortic aneurysm  Incidental finding on CT chest  ~4.4 cm diameter    # GERD  Decreased H2 blocker dose for decreased GFR once daily    # Dementia/anxiety  Continue home meds - seroquel, memantine, xanax    # Ambulates with wheelchair  f/u PT    # DVT ppx  HSQ q12, SCDs    IMPROVE VTE Individual Risk Assessment    RISK                                                                Points    [  ] Previous VTE                                                  3    [  ] Thrombophilia                                               2    [  ] Lower limb paralysis                                      2        (unable to hold up >15 seconds)      [  ] Current Cancer                                              2         (within 6 months)    [ x ] Immobilization > 24 hrs                                1    [  ] ICU/CCU stay > 24 hours                              1    [ x ] Age > 60                                                      1    IMPROVE VTE Score ______2___    IMPROVE Score 0-1: Low Risk, No VTE prophylaxis required for most patients, encourage ambulation.   IMPROVE Score 2-3: At risk, pharmacologic VTE prophylaxis is indicated for most patients (in the absence of a contraindication)  IMPROVE Score > or = 4: High Risk, pharmacologic VTE prophylaxis is indicated for most patients (in the absence of a contraindication)    # Advanced directives  FULL CODE  MOLST COMPLETE    ** COULD NOT GET PT'S SURGICAL HX OR FAMILY HX - INFO NOT IN CHART AND NH DID NOT HAVE THAT INFORMATION, REQUESTED WE CALL AGAIN 2/29/20 IN THE AM ** 89F w/ PMH as above sent in for concern for PNA, being admitted for acute hypoxic respiratory failure due to viral PNA/CAP causing COPD exacerbation.    # Acute hypoxic respiratory failure 2/2 viral PNA and superimposed bacterial CAP and COPD exacerbation  Admit to med-surg  Should treat as CAP - no recent abx use, no recent travel or exposure to anyone with recent travel, no recent hospitalization as per NH staff  Lactate normal 1.4  f/u Bcx x2  RVP positive for coronavirus  s/p 1x vanco and cefepime, 1x solumedrol  Continue cefepime, added azithromycin  Duonebs standing  Albuterol q6 PRN  Solumedrol 40 q8  Continue supplemental O2 to 90% (caution with over-oxygenating in COPD)  Chest PT  Pulse ox q4 hrs    # UTI  Pt not complaining of lower abdominal pain  UA showed + nitrites and too many bacteria to count  Cefepime should cover  f/u Ucx    #HTN  Not well-controlled - SBP in 140-150s, DBP in 100s  Continue home meds - verapamil  Held losartan for elevated BUN/Cr - unknown if KLAUDIA or CKD    #HLD  Continue home meds- simvastatin    # Hx of dysphagia and choking with eating  f/u speech/swallow eval    #Heart murmur  Could not appreciated where best heard or quality bc of patient wheezing.  With enlarged cardiac silhouette on imaging  f/u TTE    # KLAUDIA vs CKD  NH documentation does not mention CKD  Trend BMP  Renal dosing medication  Avoid nephrotoxic medications    # Hypothyroidism  Continue home dose synthroid 100    # Ascending aortic aneurysm  Incidental finding on CT chest  ~4.4 cm diameter    # GERD  Decreased H2 blocker dose for decreased GFR once daily    # Dementia/anxiety  Continue home meds - seroquel, memantine, xanax    # Ambulates with wheelchair  f/u PT    # DVT ppx  HSQ q12, SCDs    IMPROVE VTE Individual Risk Assessment    RISK                                                                Points    [  ] Previous VTE                                                  3    [  ] Thrombophilia                                               2    [  ] Lower limb paralysis                                      2        (unable to hold up >15 seconds)      [  ] Current Cancer                                              2         (within 6 months)    [ x ] Immobilization > 24 hrs                                1    [  ] ICU/CCU stay > 24 hours                              1    [ x ] Age > 60                                                      1    IMPROVE VTE Score ______2___    IMPROVE Score 0-1: Low Risk, No VTE prophylaxis required for most patients, encourage ambulation.   IMPROVE Score 2-3: At risk, pharmacologic VTE prophylaxis is indicated for most patients (in the absence of a contraindication)  IMPROVE Score > or = 4: High Risk, pharmacologic VTE prophylaxis is indicated for most patients (in the absence of a contraindication)    # Advanced directives  FULL CODE  MOLST COMPLETE

## 2020-02-28 NOTE — H&P ADULT - HISTORY OF PRESENT ILLNESS
89F w/ PMH of HTN, hypothyroidism, HLD, allergic rhinitis, chronic pain, OA, constipation, vit D deficiency, osteoporosis, GERD, COPD, dementia, anxiety, dysphagia sent in from [facility] for PNA. History obtained from patient, charts, and from aide Chata @ [place]. A few days ago the patient was coughing more and more, was seen by PCP Dr. Summers who started her on medrol dose esme and robitussin for presumed COPD exacerbation, patient did not improve, so CXR obtained 2/26/20 which showed "enlarged cardiomediastinal silhouette, hiatal hernia, hazy b/l perihilar opacities, no gross focal consolidation, no PTX". As per the NH aide Chata, the patient did not have fever/chills at the NH, exposure to sick contacts or anyone who traveled abroad recently. She was coughing up phlegm, doesn't know what color but she did not note blood. Has also been noticing her choking/coughing more when eating/drinking. As per the aide the patient's mental status baseline is oriented to self. She uses a wheelchair and can stand with assistance.    Pt is oriented only to self, was only able to contribute to history that she has been coughing more, denies chest pain/pressure. + SOB.

## 2020-02-28 NOTE — PATIENT PROFILE ADULT - SPECIFY WHICH ONES ARE ON CHART
Medical Orders for Life-Sustaining Treatment (MOLST) Zeke Garcia HCP to bring in copy of doc/Health Care Proxy (HCP)/Medical Orders for Life-Sustaining Treatment (MOLST)

## 2020-02-28 NOTE — ED ADULT NURSE REASSESSMENT NOTE - NS ED NURSE REASSESS COMMENT FT1
Assumed pt care from previous RNDanilo.  Pt alert and oriented x1 baseline dementia, stretcher side rails up, and in lowest locked position.  Safety maintained. NA at bedside for one to one

## 2020-02-28 NOTE — ED PROVIDER NOTE - CLINICAL SUMMARY MEDICAL DECISION MAKING FREE TEXT BOX
89 yo female was sent by NH for cough and wheezing x few days. Labs, CXR, ua, ct chest, meds, EKG, reeval. -Braeden Castro PA-C

## 2020-02-28 NOTE — ED ADULT NURSE NOTE - NSIMPLEMENTINTERV_GEN_ALL_ED
Implemented All Fall with Harm Risk Interventions:  [place] to call system. Call bell, personal items and telephone within reach. Instruct patient to call for assistance. Room bathroom lighting operational. Non-slip footwear when patient is off stretcher. Physically safe environment: no spills, clutter or unnecessary equipment. Stretcher in lowest position, wheels locked, appropriate side rails in place. Provide visual cue, wrist band, yellow gown, etc. Monitor gait and stability. Monitor for mental status changes and reorient to person, place, and time. Review medications for side effects contributing to fall risk. Reinforce activity limits and safety measures with patient and family. Provide visual clues: red socks.

## 2020-02-28 NOTE — H&P ADULT - NSHPPHYSICALEXAM_GEN_ALL_CORE
Vital Signs Last 24 Hrs  T(C): 36.6 (02-28-20 @ 17:19)  T(F): 97.9 (02-28-20 @ 17:19), Max: 98.3 (02-28-20 @ 14:25)  HR: 74 (02-28-20 @ 18:27) (69 - 78)  BP: 141/100 (02-28-20 @ 18:27)  BP(mean): 111 (02-28-20 @ 18:27) (101 - 115)  RR: 22 (02-28-20 @ 18:27) (16 - 24)  SpO2: 91% (02-28-20 @ 18:31) (80% - 96%)  Wt(kg): --

## 2020-02-28 NOTE — H&P ADULT - NSICDXPASTMEDICALHX_GEN_ALL_CORE_FT
PAST MEDICAL HISTORY:  Anxiety     Chronic GERD     Chronic obstructive pulmonary disease, unspecified COPD type     Dementia     Dysphagia     Hiatal hernia     Hyperlipidemia     Hypertension     Osteoarthritis     Osteoporosis

## 2020-02-29 LAB
ANION GAP SERPL CALC-SCNC: 6 MMOL/L — SIGNIFICANT CHANGE UP (ref 5–17)
BUN SERPL-MCNC: 36 MG/DL — HIGH (ref 7–23)
CALCIUM SERPL-MCNC: 9.5 MG/DL — SIGNIFICANT CHANGE UP (ref 8.5–10.1)
CHLORIDE SERPL-SCNC: 108 MMOL/L — SIGNIFICANT CHANGE UP (ref 96–108)
CO2 SERPL-SCNC: 29 MMOL/L — SIGNIFICANT CHANGE UP (ref 22–31)
CREAT SERPL-MCNC: 1.2 MG/DL — SIGNIFICANT CHANGE UP (ref 0.5–1.3)
GLUCOSE SERPL-MCNC: 158 MG/DL — HIGH (ref 70–99)
POTASSIUM SERPL-MCNC: 4.1 MMOL/L — SIGNIFICANT CHANGE UP (ref 3.5–5.3)
POTASSIUM SERPL-SCNC: 4.1 MMOL/L — SIGNIFICANT CHANGE UP (ref 3.5–5.3)
SODIUM SERPL-SCNC: 143 MMOL/L — SIGNIFICANT CHANGE UP (ref 135–145)

## 2020-02-29 PROCEDURE — 93306 TTE W/DOPPLER COMPLETE: CPT | Mod: 26

## 2020-02-29 PROCEDURE — 99232 SBSQ HOSP IP/OBS MODERATE 35: CPT | Mod: GC

## 2020-02-29 RX ORDER — HALOPERIDOL DECANOATE 100 MG/ML
0.5 INJECTION INTRAMUSCULAR ONCE
Refills: 0 | Status: COMPLETED | OUTPATIENT
Start: 2020-02-29 | End: 2020-02-29

## 2020-02-29 RX ADMIN — FAMOTIDINE 20 MILLIGRAM(S): 10 INJECTION INTRAVENOUS at 11:12

## 2020-02-29 RX ADMIN — HEPARIN SODIUM 5000 UNIT(S): 5000 INJECTION INTRAVENOUS; SUBCUTANEOUS at 05:53

## 2020-02-29 RX ADMIN — Medication 40 MILLIGRAM(S): at 13:04

## 2020-02-29 RX ADMIN — Medication 3 MILLILITER(S): at 20:14

## 2020-02-29 RX ADMIN — LORATADINE 10 MILLIGRAM(S): 10 TABLET ORAL at 11:12

## 2020-02-29 RX ADMIN — Medication 40 MILLIGRAM(S): at 21:48

## 2020-02-29 RX ADMIN — MEMANTINE HYDROCHLORIDE 5 MILLIGRAM(S): 10 TABLET ORAL at 17:24

## 2020-02-29 RX ADMIN — AZITHROMYCIN 255 MILLIGRAM(S): 500 TABLET, FILM COATED ORAL at 22:35

## 2020-02-29 RX ADMIN — CEFEPIME 1000 MILLIGRAM(S): 1 INJECTION, POWDER, FOR SOLUTION INTRAMUSCULAR; INTRAVENOUS at 11:10

## 2020-02-29 RX ADMIN — HALOPERIDOL DECANOATE 0.5 MILLIGRAM(S): 100 INJECTION INTRAMUSCULAR at 21:50

## 2020-02-29 RX ADMIN — Medication 40 MILLIGRAM(S): at 05:52

## 2020-02-29 RX ADMIN — Medication 1000 UNIT(S): at 11:12

## 2020-02-29 RX ADMIN — Medication 1 TABLET(S): at 11:11

## 2020-02-29 RX ADMIN — Medication 0.5 MILLIGRAM(S): at 17:22

## 2020-02-29 RX ADMIN — LACTULOSE 10 GRAM(S): 10 SOLUTION ORAL at 17:23

## 2020-02-29 RX ADMIN — HEPARIN SODIUM 5000 UNIT(S): 5000 INJECTION INTRAVENOUS; SUBCUTANEOUS at 17:24

## 2020-02-29 RX ADMIN — DONEPEZIL HYDROCHLORIDE 10 MILLIGRAM(S): 10 TABLET, FILM COATED ORAL at 21:48

## 2020-02-29 RX ADMIN — QUETIAPINE FUMARATE 12.5 MILLIGRAM(S): 200 TABLET, FILM COATED ORAL at 21:48

## 2020-02-29 RX ADMIN — Medication 3 MILLILITER(S): at 01:07

## 2020-02-29 NOTE — SWALLOW BEDSIDE ASSESSMENT ADULT - NS SPL SWALLOW CLINIC TRIAL FT
The pt demonstrates mild functional Oropharyngeal Presbyphagia with sufficient oropharyngeal swallowing preservation for age(89) nonetheless. While Presbyphagia may place pt at a relatively heightened risk for episodic aspiration, her oropharyngeal swallowing integrity is felt to be stable for age and at usual state. Moreover, she did NOT appear to be aspirating on clinical exam.

## 2020-02-29 NOTE — SWALLOW BEDSIDE ASSESSMENT ADULT - SWALLOW EVAL: DIAGNOSIS
1) The pt demonstrates mild functional Oropharyngeal Presbyphagia with sufficient oropharyngeal swallowing preservation for age(89) nonetheless. While Presbyphagia may place pt at a relatively heightened risk for episodic aspiration, her oropharyngeal swallowing integrity is felt to be stable for age and at usual state. Moreover, she did NOT appear to be aspirating on clinical exam.   2) The pt was alert and interactive but variably distractible/oppositional. She was able to verbalize during communicative probes. At these times, her motor speech abilities were functional and her verbalizations were linguistically intact. However, her utterances reflected variably decreased memory/insight consistent with baseline Cognitive Dysfunction associated with Dementia. Communicative integrity at usual state.

## 2020-02-29 NOTE — PROGRESS NOTE ADULT - SUBJECTIVE AND OBJECTIVE BOX
Per HPI:  89 yo female w/ PMH of HTN, hypothyroidism, HLD, allergic rhinitis, chronic pain, OA, constipation, vit D deficiency, osteoporosis, GERD, COPD, dementia, anxiety, dysphagia sent in from [facility] for PNA. History obtained from patient, charts, and from aide Chata @ [place]. A few days ago the patient was coughing more and more, was seen by PCP Dr. Summers who started her on medrol dose esme and robitussin for presumed COPD exacerbation, patient did not improve, so CXR obtained 2/26/20 which showed "enlarged cardiomediastinal silhouette, hiatal hernia, hazy b/l perihilar opacities, no gross focal consolidation, no PTX". As per the NH aide Chata, the patient did not have fever/chills at the NH, exposure to sick contacts or anyone who traveled abroad recently. She was coughing up phlegm, doesn't know what color but she did not note blood. Has also been noticing her choking/coughing more when eating/drinking. As per the aide the patient's mental status baseline is oriented to self. She uses a wheelchair and can stand with assistance.  Pt is oriented only to self, was only able to contribute to history that she has been coughing more, denies chest pain/pressure. + SOB.    SUBJECTIVE:   2/29: Pt seen and examined this AM. Pt was eating breakfast, with 1:1 as pt attempting to pull off IV lines and nasal cannula previously. As per RN, pt initially refused duoneb treatment. Pt currently on NC 2L with O2sat in the mid 90s. No reported fevers overnight.    REVIEW OF SYSTEMS:  ROS limited as pt at times does not respond. Pt however denies any pain, chest pain, or difficulty breathing. Denies abdominal pain, no nausea or vomiting.    Vital Signs Last 24 Hrs  T(C): 36.4 (29 Feb 2020 11:57), Max: 36.4 (29 Feb 2020 11:57)  T(F): 97.5 (29 Feb 2020 11:57), Max: 97.5 (29 Feb 2020 11:57)  HR: 70 (29 Feb 2020 11:57) (66 - 82)  BP: 153/79 (29 Feb 2020 11:57) (127/77 - 157/97)  BP(mean): 111 (28 Feb 2020 18:27) (111 - 111)  RR: 16 (29 Feb 2020 11:57) (12 - 23)  SpO2: 95% (29 Feb 2020 11:57) (86% - 95%)      PHYSICAL EXAM:  Constitutional: elderly frail-appearing female, in NAD, awake, alert  HEENT: PERR, EOMI, Normal Hearing, MMM  Neck: Soft and supple, No LAD, No JVD  Respiratory: good respiratory effort, scattered expiratory wheezes throughout anterior and posterior lung fields  Cardiovascular: S1 and S2, regular rate and rhythm, no Murmurs, gallops or rubs  Gastrointestinal: Bowel Sounds present, soft, nontender, nondistended, no guarding, no rebound  Extremities: No peripheral edema  Vascular: 2+ peripheral pulses  Neurological: A/O x 1-2, no focal deficits  Musculoskeletal: 5/5 strength b/l upper and lower extremities  Skin: No rashes    MEDICATIONS:  MEDICATIONS  (STANDING):  albuterol/ipratropium for Nebulization 3 milliLiter(s) Nebulizer every 6 hours  ALPRAZolam 0.5 milliGRAM(s) Oral two times a day  azithromycin  IVPB      azithromycin  IVPB 500 milliGRAM(s) IV Intermittent every 24 hours  calcium carbonate 1250 mG  + Vitamin D (OsCal 500 + D) 1 Tablet(s) Oral daily  cefepime  Injectable. 1000 milliGRAM(s) IV Push daily  cholecalciferol 1000 Unit(s) Oral daily  donepezil 10 milliGRAM(s) Oral at bedtime  famotidine    Tablet 20 milliGRAM(s) Oral daily  fluticasone propionate 50 MICROgram(s)/spray Nasal Spray 1 Spray(s) Both Nostrils daily  heparin  Injectable 5000 Unit(s) SubCutaneous every 12 hours  lactulose Syrup 10 Gram(s) Oral two times a day  levothyroxine 100 MICROGram(s) Oral daily  loratadine 10 milliGRAM(s) Oral daily  memantine 5 milliGRAM(s) Oral two times a day  methylPREDNISolone sodium succinate Injectable 40 milliGRAM(s) IV Push every 8 hours  QUEtiapine 12.5 milliGRAM(s) Oral at bedtime  senna 2 Tablet(s) Oral at bedtime  simvastatin 10 milliGRAM(s) Oral at bedtime  verapamil  milliGRAM(s) Oral daily      LABS: All Labs Reviewed:                        14.3   11.70 )-----------( 203      ( 28 Feb 2020 14:31 )             45.6     02-29    143  |  108  |  36<H>  ----------------------------<  158<H>  4.1   |  29  |  1.20    Ca    9.5      29 Feb 2020 06:23    TPro  7.8  /  Alb  3.1<L>  /  TBili  0.3  /  DBili  x   /  AST  19  /  ALT  18  /  AlkPhos  86  02-28    PT/INR - ( 28 Feb 2020 14:31 )   PT: 12.2 sec;   INR: 1.10 ratio         PTT - ( 28 Feb 2020 14:31 )  PTT:29.3 sec      Blood Culture:     RADIOLOGY/EKG:    DVT PPX:    ADVANCED DIRECTIVE:    DISPOSITION: Per HPI:  89 yo female w/ PMH of HTN, hypothyroidism, HLD, allergic rhinitis, chronic pain, OA, constipation, vit D deficiency, osteoporosis, GERD, COPD, dementia, anxiety, dysphagia sent in from [facility] for PNA. History obtained from patient, charts, and from aide Chata @ [place]. A few days ago the patient was coughing more and more, was seen by PCP Dr. Summers who started her on medrol dose esme and robitussin for presumed COPD exacerbation, patient did not improve, so CXR obtained 2/26/20 which showed "enlarged cardiomediastinal silhouette, hiatal hernia, hazy b/l perihilar opacities, no gross focal consolidation, no PTX". As per the NH aide Chata, the patient did not have fever/chills at the NH, exposure to sick contacts or anyone who traveled abroad recently. She was coughing up phlegm, doesn't know what color but she did not note blood. Has also been noticing her choking/coughing more when eating/drinking. As per the aide the patient's mental status baseline is oriented to self. She uses a wheelchair and can stand with assistance.  Pt is oriented only to self, was only able to contribute to history that she has been coughing more, denies chest pain/pressure. + SOB.    SUBJECTIVE:   2/29: Pt seen and examined this AM. Pt was eating breakfast, with 1:1 as pt attempting to pull off IV lines and nasal cannula previously. As per RN, pt initially refused duoneb treatment. Pt currently on NC 2L with O2sat in the mid 90s. No reported fevers overnight.    REVIEW OF SYSTEMS:  ROS limited as pt at times does not respond. Pt however denies any pain, chest pain, or difficulty breathing. Denies abdominal pain, no nausea or vomiting.    Vital Signs Last 24 Hrs  T(C): 36.4 (29 Feb 2020 11:57), Max: 36.4 (29 Feb 2020 11:57)  T(F): 97.5 (29 Feb 2020 11:57), Max: 97.5 (29 Feb 2020 11:57)  HR: 70 (29 Feb 2020 11:57) (66 - 82)  BP: 153/79 (29 Feb 2020 11:57) (127/77 - 157/97)  BP(mean): 111 (28 Feb 2020 18:27) (111 - 111)  RR: 16 (29 Feb 2020 11:57) (12 - 23)  SpO2: 95% (29 Feb 2020 11:57) (86% - 95%)      PHYSICAL EXAM:  Constitutional: elderly frail-appearing female, in NAD, awake, alert  HEENT: PERR, EOMI, Normal Hearing, MMM  Neck: Soft and supple, No LAD, No JVD  Respiratory: good respiratory effort, scattered expiratory wheezes throughout anterior and posterior lung fields  Cardiovascular: S1 and S2, regular rate and rhythm, no Murmurs, gallops or rubs  Gastrointestinal: Bowel Sounds present, soft, nontender, nondistended, no guarding, no rebound  Extremities: No peripheral edema  Vascular: 2+ peripheral pulses  Neurological: A/O x 1-2, no focal deficits  Musculoskeletal: 5/5 strength b/l upper and lower extremities  Skin: No rashes    MEDICATIONS:  MEDICATIONS  (STANDING):  albuterol/ipratropium for Nebulization 3 milliLiter(s) Nebulizer every 6 hours  ALPRAZolam 0.5 milliGRAM(s) Oral two times a day  azithromycin  IVPB      azithromycin  IVPB 500 milliGRAM(s) IV Intermittent every 24 hours  calcium carbonate 1250 mG  + Vitamin D (OsCal 500 + D) 1 Tablet(s) Oral daily  cefepime  Injectable. 1000 milliGRAM(s) IV Push daily  cholecalciferol 1000 Unit(s) Oral daily  donepezil 10 milliGRAM(s) Oral at bedtime  famotidine    Tablet 20 milliGRAM(s) Oral daily  fluticasone propionate 50 MICROgram(s)/spray Nasal Spray 1 Spray(s) Both Nostrils daily  heparin  Injectable 5000 Unit(s) SubCutaneous every 12 hours  lactulose Syrup 10 Gram(s) Oral two times a day  levothyroxine 100 MICROGram(s) Oral daily  loratadine 10 milliGRAM(s) Oral daily  memantine 5 milliGRAM(s) Oral two times a day  methylPREDNISolone sodium succinate Injectable 40 milliGRAM(s) IV Push every 8 hours  QUEtiapine 12.5 milliGRAM(s) Oral at bedtime  senna 2 Tablet(s) Oral at bedtime  simvastatin 10 milliGRAM(s) Oral at bedtime  verapamil  milliGRAM(s) Oral daily      LABS: All Labs Reviewed:                        14.3   11.70 )-----------( 203      ( 28 Feb 2020 14:31 )             45.6     02-29    143  |  108  |  36<H>  ----------------------------<  158<H>  4.1   |  29  |  1.20    Ca    9.5      29 Feb 2020 06:23    TPro  7.8  /  Alb  3.1<L>  /  TBili  0.3  /  DBili  x   /  AST  19  /  ALT  18  /  AlkPhos  86  02-28    PT/INR - ( 28 Feb 2020 14:31 )   PT: 12.2 sec;   INR: 1.10 ratio         PTT - ( 28 Feb 2020 14:31 )  PTT:29.3 sec      < from: CT Chest w/ IV Cont (02.28.20 @ 17:18) >  FINDINGS:    LUNGS AND AIRWAYS: Evaluation limited due to motion artifact. Mucoid impacted airways in the right lower lobe. Bibasilar and lingular subsegmental atelectasis. Patchy opacities within the left lower lobe and lingular suspicious for pneumonia (2, 28).    PLEURA: No pleural effusion.    MEDIASTINUM AND COCO: 1.1 cm prevascular space node.. Large hiatal hernia.    VESSELS: Aneurysmal dilatation of the ascending aorta up to 4.4 cm. Atherosclerotic changes of the aorta and coronary arteries.    HEART: Cardiomegaly.  No pericardial effusion.    CHEST WALL AND LOWER NECK: Within normal limits.    VISUALIZED UPPER ABDOMEN: Small left hepatic lobe cyst. Small left renal cyst.    BONES: Degenerative changes. Multilevel loss of vertebral body height within the visualized thoracolumbar spine. Chronic right rib fracture deformity.    IMPRESSION:     1. Suspected left lower lobe and lingular pneumonia. Follow-up to resolution is recommended.    2. Aneurysmal dilatation of the ascending aorta up to 4.4 cm.

## 2020-02-29 NOTE — SWALLOW BEDSIDE ASSESSMENT ADULT - PHARYNGEAL PHASE
Swallow was triggered in an acceptable time frame for age.  Laryngeal lift on palpation during swallowing trials was very mildly decreased but felt to be functional for age. NO behavioral aspiration signs exhibited. Odynophagia was denied. Swallow was triggered in an acceptable time frame for age.  Laryngeal lift on palpation during swallowing trials was very mildly decreased but felt to be functional for age as well. NO behavioral aspiration signs exhibited. Odynophagia was denied.

## 2020-02-29 NOTE — PROGRESS NOTE ADULT - ASSESSMENT
89F w/ PMH as above sent in for concern for PNA, being admitted for acute hypoxic respiratory failure due to viral PNA/CAP causing COPD exacerbation.    # Acute hypoxic respiratory failure 2/2 viral PNA and superimposed bacterial CAP and COPD exacerbation  Admit to med-surg  Should treat as CAP - no recent abx use, no recent travel or exposure to anyone with recent travel, no recent hospitalization as per NH staff  Lactate normal 1.4  f/u Bcx x2  RVP positive for coronavirus  s/p 1x vanco and cefepime, 1x solumedrol  Continue cefepime, added azithromycin  Duonebs standing  Albuterol q6 PRN  Solumedrol 40 q8  Continue supplemental O2 to 90% (caution with over-oxygenating in COPD)  Chest PT  Pulse ox q4 hrs    # UTI  Pt not complaining of lower abdominal pain  UA showed + nitrites and too many bacteria to count  Cefepime should cover  f/u Ucx    #HTN  Not well-controlled - SBP in 140-150s, DBP in 100s  Continue home meds - verapamil  Held losartan for elevated BUN/Cr - unknown if KLAUDIA or CKD    #HLD  Continue home meds- simvastatin    # Hx of dysphagia and choking with eating  f/u speech/swallow eval    #Heart murmur  Could not appreciated where best heard or quality bc of patient wheezing.  With enlarged cardiac silhouette on imaging  f/u TTE    # KLAUDIA vs CKD  NH documentation does not mention CKD  Trend BMP  Renal dosing medication  Avoid nephrotoxic medications    # Hypothyroidism  Continue home dose synthroid 100    # Ascending aortic aneurysm  Incidental finding on CT chest  ~4.4 cm diameter    # GERD  Decreased H2 blocker dose for decreased GFR once daily    # Dementia/anxiety  Continue home meds - seroquel, memantine, xanax    # Ambulates with wheelchair  f/u PT    # DVT ppx  HSQ q12, SCDs    IMPROVE VTE Individual Risk Assessment    RISK                                                                Points    [  ] Previous VTE                                                  3    [  ] Thrombophilia                                               2    [  ] Lower limb paralysis                                      2        (unable to hold up >15 seconds)      [  ] Current Cancer                                              2         (within 6 months)    [ x ] Immobilization > 24 hrs                                1    [  ] ICU/CCU stay > 24 hours                              1    [ x ] Age > 60                                                      1    IMPROVE VTE Score ______2___    IMPROVE Score 0-1: Low Risk, No VTE prophylaxis required for most patients, encourage ambulation.   IMPROVE Score 2-3: At risk, pharmacologic VTE prophylaxis is indicated for most patients (in the absence of a contraindication)  IMPROVE Score > or = 4: High Risk, pharmacologic VTE prophylaxis is indicated for most patients (in the absence of a contraindication)    # Advanced directives  FULL CODE  MOLST COMPLETE 89 yo female with h/o HTN, hypothyroidism, HLD, allergic rhinitis, chronic pain, Osteoarthritis, constipation, vit D deficiency, osteoporosis, GERD, COPD, dementia, anxiety, and dysphagia sent in from [facility] for evaluation of worsening cough with outpt CXR concerning for b/l perihilar opacities, pt found to have O2 desats in the ED, and with + coronavirus, admitted for acute hypoxic respiratory failure secondary to acute viral PNA vs. CAP with COPD exacerbation.    # Acute hypoxic respiratory failure 2/2 viral PNA and superimposed bacterial CAP and COPD exacerbation  - CT chest on admission as above  - PNA concerning for CAP as pt w/o recent abx use, travel or exposure to anyone with recent travel, no recent hospitalization as per NH staff  - Lactate on admission WNL  - RVP with +coronavirus  - s/p 1x vanco and cefepime, 1x solumedrol in the ED  - Continue on cefepime and azithromycin  - f/u Bcx x2  Duonebs standing  Albuterol q6 PRN  Solumedrol 40 q8  Continue supplemental O2 to 90% (caution with over-oxygenating in COPD)  Chest PT  Pulse ox q4 hrs    # UTI  Pt not complaining of lower abdominal pain  UA showed + nitrites and too many bacteria to count  Cefepime should cover  f/u Ucx    #HTN  Not well-controlled - SBP in 140-150s, DBP in 100s  Continue home meds - verapamil  Held losartan for elevated BUN/Cr - unknown if KLAUDIA or CKD    #HLD  Continue home meds- simvastatin    # Hx of dysphagia and choking with eating  f/u speech/swallow eval    #Heart murmur  Could not appreciated where best heard or quality bc of patient wheezing.  With enlarged cardiac silhouette on imaging  f/u TTE    # KLAUDIA vs CKD  NH documentation does not mention CKD  Trend BMP  Renal dosing medication  Avoid nephrotoxic medications    # Hypothyroidism  Continue home dose synthroid 100    # Ascending aortic aneurysm  Incidental finding on CT chest  ~4.4 cm diameter    # GERD  Decreased H2 blocker dose for decreased GFR once daily    # Dementia/anxiety  Continue home meds - seroquel, memantine, xanax    # Ambulates with wheelchair  f/u PT    # DVT ppx  HSQ q12, SCDs    IMPROVE VTE Individual Risk Assessment    RISK                                                                Points    [  ] Previous VTE                                                  3    [  ] Thrombophilia                                               2    [  ] Lower limb paralysis                                      2        (unable to hold up >15 seconds)      [  ] Current Cancer                                              2         (within 6 months)    [ x ] Immobilization > 24 hrs                                1    [  ] ICU/CCU stay > 24 hours                              1    [ x ] Age > 60                                                      1    IMPROVE VTE Score ______2___    IMPROVE Score 0-1: Low Risk, No VTE prophylaxis required for most patients, encourage ambulation.   IMPROVE Score 2-3: At risk, pharmacologic VTE prophylaxis is indicated for most patients (in the absence of a contraindication)  IMPROVE Score > or = 4: High Risk, pharmacologic VTE prophylaxis is indicated for most patients (in the absence of a contraindication)    # Advanced directives  FULL CODE  MOLST COMPLETE 89 yo female with h/o HTN, hypothyroidism, HLD, allergic rhinitis, chronic pain, Osteoarthritis, constipation, vit D deficiency, osteoporosis, GERD, COPD, dementia, anxiety, and dysphagia sent in from [facility] for evaluation of worsening cough with outpt CXR concerning for b/l perihilar opacities, pt found to have O2 desats in the ED, and with + coronavirus, admitted for acute hypoxic respiratory failure secondary to acute viral PNA vs. CAP with COPD exacerbation.    # Acute hypoxic respiratory failure 2/2 viral PNA and superimposed bacterial CAP and COPD exacerbation  - CT chest on admission as above  - PNA concerning for CAP as pt w/o recent abx use, travel or exposure to anyone with recent travel, no recent hospitalization as per NH staff  - Lactate on admission WNL  - RVP with +coronavirus  - s/p 1x vanco and cefepime, 1x solumedrol in the ED  - Continue on cefepime and azithromycin  - Bcx x2 on admission pending results  - Duonebs Q6hr  - Cotnit[place]ue Albuterol q6 PRN  - Continue Solumedrol 40 q8  - Continue supplemental O2 to 90% (caution with over-oxygenating in COPD)  - Chest PT  - Pulse ox q4 hrs    # UTI  Pt not complaining of lower abdominal pain  UA showed + nitrites and too many bacteria to count  - Ucx with >100,000 gram neg rods, e.coli suspected, speciation however pending  - Pt on Cefepime    #HTN  - SBP in the 150s, diastolic improved  - Continue home med verapamil  - Losartan held given elevated BUN/Cr on admission- Cr improving  - Will consider resuming losartan if AM BMP shows Cr continuing to improve    #HLD  Continue home meds- simvastatin    # Hx of dysphagia and choking with eating  - speech/swallow eval - regular DASH/TLC diet with thin liquid consistency    #Heart murmur  - Echo done - pending read    # KLAUDIA vs CKD  - NH documentation does not mention CKD  - Cr improved to 1.20 from 1.42 on admission  -Renal dosing medication  -Avoid nephrotoxic medications  - f/u AM BMP for repeat Cr    # Hypothyroidism  Continue home dose synthroid 100    # Ascending aortic aneurysm  Incidental finding on CT chest  ~4.4 cm diameter    # GERD  Decreased H2 blocker dose for decreased GFR once daily    # Dementia/anxiety  Continue home meds - seroquel, memantine, xanax    # Ambulates with wheelchair  - PT eval pending    # DVT ppx  HSQ q12, SCDs    IMPROVE VTE Individual Risk Assessment  IMPROVE VTE Score ______2___      # Advanced directives  FULL CODE  MOLST COMPLETE

## 2020-02-29 NOTE — SWALLOW BEDSIDE ASSESSMENT ADULT - ASPIRATION PRECAUTIONS
MAINTAIN ASPIRATION PRECAUTIONS AS A CONSERVATIVE MEASURE. NOTE THAT WHILE PRESBYPHAGIA MAY PLACE PT AT A RELATIVELY HEIGHTENED RISK FOR EPISODIC ASPIRATION, SHE DID NOT APPEAR TO BE ASPIRATING ON CLINICAL EXAM./yes

## 2020-02-29 NOTE — SWALLOW BEDSIDE ASSESSMENT ADULT - ADDITIONAL RECOMMENDATIONS
1) Hospitalist follow up. The pt demonstrates mild functional Oropharyngeal Presbyphagia with sufficient oropharyngeal swallowing preservation for age(89) nonetheless. While Presbyphagia may place pt at a relatively heightened risk for episodic aspiration, her oropharyngeal swallowing integrity is felt to be stable for age and at usual state. Moreover, she did NOT appear to be aspirating on clinical exam.    2) Nutrition follow up. Patient with a history of HTN, HLD, Vitamin D deficiency, GERD, etc.

## 2020-02-29 NOTE — SWALLOW BEDSIDE ASSESSMENT ADULT - ORAL PHASE
Bolus formation/transfer were mildly prolonged but mechanically functional for age. She was able to clear oral debris on her own after age acceptable piecemeal deglutition.

## 2020-02-29 NOTE — SWALLOW BEDSIDE ASSESSMENT ADULT - COMMENTS
The patient was admitted to  from [facility] with a cough/SOB. Hospital course is notable for a UTI, KLAUDIA, COPD exacerbation with possible PNA, RVP panel being + for Coronavirus and CT being notable for an ascending aortic aneurysm. She has an underlying history of GERD, a hiatal hernia, Dysphagia NOS, COPD, HTN, HLD, Dementia, Vitamin D deficiency, anxiety, hypothyroidism, constipation, OP, OA, and past left TKR.

## 2020-02-29 NOTE — SWALLOW BEDSIDE ASSESSMENT ADULT - SWALLOW EVAL: CRITERIA FOR SKILLED INTERVENTION MET
DO NOT FEEL THAT ACUTE SPEECH PATHOLOGY INTERVENTION WOULD CHANGE CLINICAL MANAGEMENT OR BE OF CLINICAL BENEFIT. PT'S COGNITIVE DEFICITS AND PRESBYPHAGIA ARE FELT TO BE CHRONIC/PRE-EXISTING. HER COMMUNICATIVE/SWALLOWING INTEGRITY ARE FELT TO BE AT USUAL BASELINE STATE/MAXIMIZED. GIVEN ABOVE, WILL NOT ACTIVELY. RECONSULT PRN.

## 2020-02-29 NOTE — SWALLOW BEDSIDE ASSESSMENT ADULT - SWALLOW EVAL: RECOMMENDED DIET
SUGGEST A DASH/TLC REGULAR TEXTURE DIET WITH THIN LIQUID CONSISTENCIES AS THE PATIENT APPEARED CLINICALLY TOLERANT OF THESE FOOD CONSISTENCIES FROM AN OROPHARYNGEAL SWALLOWING STANCE, DESPITE PRESBYPHAGIA.  PT FELT TO BE AT SWALLOWING BASELINE.

## 2020-02-29 NOTE — SWALLOW BEDSIDE ASSESSMENT ADULT - SLP GENERAL OBSERVATIONS
The pt was alert and interactive but variably distractible/oppositional. She was able to verbalize during communicative probes. At these times, her motor speech abilities were functional and her verbalizations were linguistically intact. However, her utterances reflected variably decreased memory/insight consistent with baseline Cognitive Dysfunction associated with Dementia. Communicative integrity at usual state.

## 2020-02-29 NOTE — PROGRESS NOTE ADULT - SUBJECTIVE AND OBJECTIVE BOX
Pt has been seen and examined with FP resident, resident supervised agree with a/p       Patient is a 89y old  Female who presents with a chief complaint of PNA (28 Feb 2020 20:05)          PHYSICAL EXAM:  Vital Signs Last 24 Hrs  T(C): 36.4 (29 Feb 2020 11:57), Max: 36.8 (28 Feb 2020 14:25)  T(F): 97.5 (29 Feb 2020 11:57), Max: 98.3 (28 Feb 2020 14:25)  HR: 70 (29 Feb 2020 11:57) (66 - 82)  BP: 153/79 (29 Feb 2020 11:57) (127/77 - 164/98)  BP(mean): 111 (28 Feb 2020 18:27) (101 - 115)  RR: 16 (29 Feb 2020 11:57) (12 - 24)  SpO2: 95% (29 Feb 2020 11:57) (80% - 96%)  general- comfortable   -rs-aeeb, wheeze present b/l   -cvs-s1s2 normal   -p/a-soft,bs+        A/P    #ct iv steroids, supportive care     #dvt pr

## 2020-03-01 LAB
-  AMIKACIN: SIGNIFICANT CHANGE UP
-  AMPICILLIN/SULBACTAM: SIGNIFICANT CHANGE UP
-  AMPICILLIN: SIGNIFICANT CHANGE UP
-  AZTREONAM: SIGNIFICANT CHANGE UP
-  CEFAZOLIN: SIGNIFICANT CHANGE UP
-  CEFEPIME: SIGNIFICANT CHANGE UP
-  CEFOXITIN: SIGNIFICANT CHANGE UP
-  CEFTRIAXONE: SIGNIFICANT CHANGE UP
-  CIPROFLOXACIN: SIGNIFICANT CHANGE UP
-  GENTAMICIN: SIGNIFICANT CHANGE UP
-  IMIPENEM: SIGNIFICANT CHANGE UP
-  LEVOFLOXACIN: SIGNIFICANT CHANGE UP
-  MEROPENEM: SIGNIFICANT CHANGE UP
-  NITROFURANTOIN: SIGNIFICANT CHANGE UP
-  PIPERACILLIN/TAZOBACTAM: SIGNIFICANT CHANGE UP
-  TIGECYCLINE: SIGNIFICANT CHANGE UP
-  TOBRAMYCIN: SIGNIFICANT CHANGE UP
-  TRIMETHOPRIM/SULFAMETHOXAZOLE: SIGNIFICANT CHANGE UP
ANION GAP SERPL CALC-SCNC: 6 MMOL/L — SIGNIFICANT CHANGE UP (ref 5–17)
BASOPHILS # BLD AUTO: 0 K/UL — SIGNIFICANT CHANGE UP (ref 0–0.2)
BASOPHILS NFR BLD AUTO: 0 % — SIGNIFICANT CHANGE UP (ref 0–2)
BUN SERPL-MCNC: 46 MG/DL — HIGH (ref 7–23)
CALCIUM SERPL-MCNC: 9.2 MG/DL — SIGNIFICANT CHANGE UP (ref 8.5–10.1)
CHLORIDE SERPL-SCNC: 110 MMOL/L — HIGH (ref 96–108)
CO2 SERPL-SCNC: 26 MMOL/L — SIGNIFICANT CHANGE UP (ref 22–31)
CREAT SERPL-MCNC: 1.03 MG/DL — SIGNIFICANT CHANGE UP (ref 0.5–1.3)
CULTURE RESULTS: SIGNIFICANT CHANGE UP
EOSINOPHIL # BLD AUTO: 0 K/UL — SIGNIFICANT CHANGE UP (ref 0–0.5)
EOSINOPHIL NFR BLD AUTO: 0 % — SIGNIFICANT CHANGE UP (ref 0–6)
GLUCOSE SERPL-MCNC: 167 MG/DL — HIGH (ref 70–99)
HCT VFR BLD CALC: 40.8 % — SIGNIFICANT CHANGE UP (ref 34.5–45)
HGB BLD-MCNC: 13.5 G/DL — SIGNIFICANT CHANGE UP (ref 11.5–15.5)
LYMPHOCYTES # BLD AUTO: 1.61 K/UL — SIGNIFICANT CHANGE UP (ref 1–3.3)
LYMPHOCYTES # BLD AUTO: 14 % — SIGNIFICANT CHANGE UP (ref 13–44)
MCHC RBC-ENTMCNC: 32.1 PG — SIGNIFICANT CHANGE UP (ref 27–34)
MCHC RBC-ENTMCNC: 33.1 GM/DL — SIGNIFICANT CHANGE UP (ref 32–36)
MCV RBC AUTO: 97.1 FL — SIGNIFICANT CHANGE UP (ref 80–100)
METHOD TYPE: SIGNIFICANT CHANGE UP
MONOCYTES # BLD AUTO: 0.23 K/UL — SIGNIFICANT CHANGE UP (ref 0–0.9)
MONOCYTES NFR BLD AUTO: 2 % — SIGNIFICANT CHANGE UP (ref 2–14)
NEUTROPHILS # BLD AUTO: 9.68 K/UL — HIGH (ref 1.8–7.4)
NEUTROPHILS NFR BLD AUTO: 82 % — HIGH (ref 43–77)
NRBC # BLD: SIGNIFICANT CHANGE UP /100 WBCS (ref 0–0)
ORGANISM # SPEC MICROSCOPIC CNT: SIGNIFICANT CHANGE UP
ORGANISM # SPEC MICROSCOPIC CNT: SIGNIFICANT CHANGE UP
PLATELET # BLD AUTO: 226 K/UL — SIGNIFICANT CHANGE UP (ref 150–400)
POTASSIUM SERPL-MCNC: 3.9 MMOL/L — SIGNIFICANT CHANGE UP (ref 3.5–5.3)
POTASSIUM SERPL-SCNC: 3.9 MMOL/L — SIGNIFICANT CHANGE UP (ref 3.5–5.3)
RBC # BLD: 4.2 M/UL — SIGNIFICANT CHANGE UP (ref 3.8–5.2)
RBC # FLD: 12.2 % — SIGNIFICANT CHANGE UP (ref 10.3–14.5)
SODIUM SERPL-SCNC: 142 MMOL/L — SIGNIFICANT CHANGE UP (ref 135–145)
SPECIMEN SOURCE: SIGNIFICANT CHANGE UP
WBC # BLD: 11.52 K/UL — HIGH (ref 3.8–10.5)
WBC # FLD AUTO: 11.52 K/UL — HIGH (ref 3.8–10.5)

## 2020-03-01 PROCEDURE — 99232 SBSQ HOSP IP/OBS MODERATE 35: CPT | Mod: GC

## 2020-03-01 RX ORDER — LOSARTAN POTASSIUM 100 MG/1
50 TABLET, FILM COATED ORAL DAILY
Refills: 0 | Status: DISCONTINUED | OUTPATIENT
Start: 2020-03-01 | End: 2020-03-05

## 2020-03-01 RX ADMIN — DONEPEZIL HYDROCHLORIDE 10 MILLIGRAM(S): 10 TABLET, FILM COATED ORAL at 22:54

## 2020-03-01 RX ADMIN — LACTULOSE 10 GRAM(S): 10 SOLUTION ORAL at 06:13

## 2020-03-01 RX ADMIN — HEPARIN SODIUM 5000 UNIT(S): 5000 INJECTION INTRAVENOUS; SUBCUTANEOUS at 16:59

## 2020-03-01 RX ADMIN — Medication 0.5 MILLIGRAM(S): at 06:10

## 2020-03-01 RX ADMIN — Medication 40 MILLIGRAM(S): at 22:55

## 2020-03-01 RX ADMIN — Medication 0.5 MILLIGRAM(S): at 16:57

## 2020-03-01 RX ADMIN — HEPARIN SODIUM 5000 UNIT(S): 5000 INJECTION INTRAVENOUS; SUBCUTANEOUS at 06:10

## 2020-03-01 RX ADMIN — AZITHROMYCIN 255 MILLIGRAM(S): 500 TABLET, FILM COATED ORAL at 22:52

## 2020-03-01 RX ADMIN — Medication 3 MILLILITER(S): at 13:21

## 2020-03-01 RX ADMIN — Medication 100 MICROGRAM(S): at 06:12

## 2020-03-01 RX ADMIN — CEFEPIME 1000 MILLIGRAM(S): 1 INJECTION, POWDER, FOR SOLUTION INTRAMUSCULAR; INTRAVENOUS at 11:01

## 2020-03-01 RX ADMIN — Medication 40 MILLIGRAM(S): at 06:12

## 2020-03-01 RX ADMIN — Medication 240 MILLIGRAM(S): at 06:12

## 2020-03-01 RX ADMIN — Medication 3 MILLILITER(S): at 20:15

## 2020-03-01 RX ADMIN — Medication 3 MILLILITER(S): at 08:17

## 2020-03-01 RX ADMIN — MEMANTINE HYDROCHLORIDE 5 MILLIGRAM(S): 10 TABLET ORAL at 06:12

## 2020-03-01 RX ADMIN — QUETIAPINE FUMARATE 12.5 MILLIGRAM(S): 200 TABLET, FILM COATED ORAL at 22:56

## 2020-03-01 RX ADMIN — MEMANTINE HYDROCHLORIDE 5 MILLIGRAM(S): 10 TABLET ORAL at 16:57

## 2020-03-01 RX ADMIN — SIMVASTATIN 10 MILLIGRAM(S): 20 TABLET, FILM COATED ORAL at 22:56

## 2020-03-01 RX ADMIN — Medication 40 MILLIGRAM(S): at 14:01

## 2020-03-01 RX ADMIN — Medication 3 MILLILITER(S): at 01:15

## 2020-03-01 RX ADMIN — SENNA PLUS 2 TABLET(S): 8.6 TABLET ORAL at 22:56

## 2020-03-01 NOTE — PROGRESS NOTE ADULT - ASSESSMENT
89 yo female with h/o HTN, hypothyroidism, HLD, allergic rhinitis, chronic pain, Osteoarthritis, constipation, vit D deficiency, osteoporosis, GERD, COPD, dementia, anxiety, and dysphagia sent in from [facility] for evaluation of worsening cough with outpt CXR concerning for b/l perihilar opacities, pt found to have O2 desats in the ED, and with + coronavirus, admitted for acute hypoxic respiratory failure secondary to acute viral PNA vs. CAP with COPD exacerbation.    # Acute hypoxic respiratory failure 2/2 viral PNA and superimposed bacterial CAP and COPD exacerbation  - CT chest on admission as above  - PNA concerning for CAP as pt w/o recent abx use, travel or exposure to anyone with recent travel, no recent hospitalization as per NH staff  - Lactate on admission WNL  - RVP with +coronavirus  - s/p 1x vanco and cefepime, 1x solumedrol in the ED  - Continue on cefepime and azithromycin  - Bcx: NGTD  - Duonebs Q6hr  - Cotnitnue Albuterol q6 PRN  - Continue Solumedrol 40 q8  - Continue supplemental O2 to 90% (caution with over-oxygenating in COPD)  - Chest PT  - Pulse ox q4 hrs    # UTI  - no new fevers  - Ucx :  >100,000 gram neg rods, e.coli suspected, speciation however pending  - will continue Cefepime      #HTN  - SBP max in the 170s this AM, will restart home losartan 50 mg QD as creatine improved on AM labs  - Continue home med verapamil  - Will continue to monitor BP's    #HLD  -Continue home meds- simvastatin    # Hx of dysphagia and choking with eating  - speech/swallow eval - regular DASH/TLC diet with thin liquid consistency    #Heart murmur  - ECHO 2/29 reviewed : normal EF 70-75%, mild aortic regurg, mild aortic stenosis, mild MR regurg  - Recommend outpatient follow up with PCP    # KLAUDIA vs CKD  - NH documentation does not mention CKD  - Cr improved this AM  -continue with Renal dosing medication  -Avoid nephrotoxic medications  - will continue to follow RFT's with morning labs    # Hypothyroidism  -Continue home dose synthroid 100    # Ascending aortic aneurysm  I-ncidental finding on CT chest  ~4.4 cm diameter    # GERD  -Decreased H2 blocker dose for decreased GFR once daily    # Dementia/anxiety  -Continue home meds - seroquel, memantine, xanax    # Ambulates with wheelchair  - PT eval pending    # DVT ppx  Heparin 5000 q12, SCDs      # Advanced directives  FULL CODE, MOLST completed

## 2020-03-01 NOTE — PROGRESS NOTE ADULT - ATTENDING COMMENTS
on exam- comfortable   -rs-aeeb, cta  -p/a-soft, bs+  -cvs-s1s2 normal    #ct steroids, supportive care     #dvt pr

## 2020-03-01 NOTE — PROGRESS NOTE ADULT - SUBJECTIVE AND OBJECTIVE BOX
89 yo female with h/o HTN, hypothyroidism, HLD, allergic rhinitis, chronic pain, Osteoarthritis, constipation, vit D deficiency, osteoporosis, GERD, COPD, dementia, anxiety, and dysphagia sent in from [facility] for evaluation of worsening cough with outpt CXR concerning for b/l perihilar opacities, pt found to have O2 desats in the ED, and with + coronavirus, admitted for acute hypoxic respiratory failure secondary to acute viral PNA vs. CAP with COPD exacerbation. Currently managed with O2 supplementation, IV steroids, Duonebs, and supportive care. UTI on admission, started on Cefipime    SUBJECTIVE: Patient was seen and examined this AM. Breathing comfortably on RA. Agitated overnight, received 1 time dose of Haldol. No other issues.     REVIEW OF SYSTEMS: Unable to assess given patient mental status- A times O times 1    Vital Signs Last 24 Hrs  T(C): 36.4 (01 Mar 2020 11:11), Max: 36.6 (29 Feb 2020 23:01)  T(F): 97.6 (01 Mar 2020 11:11), Max: 97.8 (29 Feb 2020 23:01)  HR: 57 (01 Mar 2020 11:11) (57 - 85)  BP: 154/80 (01 Mar 2020 11:11) (132/77 - 176/92)  RR: 18 (01 Mar 2020 11:11) (16 - 18)  SpO2: 95% (01 Mar 2020 11:11) (94% - 95%)    I&O's Summary    29 Feb 2020 07:01  -  01 Mar 2020 07:00  --------------------------------------------------------  IN: 250 mL / OUT: 0 mL / NET: 250 mL    PHYSICAL EXAM:  Constitutional: elderly frail-appearing female, in NAD, awake, alert  HEENT: PERR, EOMI, Normal Hearing, MMM  Neck: Soft and supple, No LAD, No JVD  Respiratory: good respiratory effort, scattered expiratory wheezes throughout anterior and posterior lung fields  Cardiovascular: S1 and S2, regular rate and rhythm, no Murmurs, gallops or rubs  Gastrointestinal: Bowel Sounds present, soft, nontender, nondistended, no guarding, no rebound  Extremities: No peripheral edema  Vascular: 2+ peripheral pulses  Neurological: A/O x 1-2, no focal deficits  Musculoskeletal: 5/5 strength b/l upper and lower extremities  Skin: No rashes    MEDICATIONS:  MEDICATIONS  (STANDING):  albuterol/ipratropium for Nebulization 3 milliLiter(s) Nebulizer every 6 hours  ALPRAZolam 0.5 milliGRAM(s) Oral two times a day  azithromycin  IVPB      azithromycin  IVPB 500 milliGRAM(s) IV Intermittent every 24 hours  calcium carbonate 1250 mG  + Vitamin D (OsCal 500 + D) 1 Tablet(s) Oral daily  cefepime  Injectable. 1000 milliGRAM(s) IV Push daily  cholecalciferol 1000 Unit(s) Oral daily  donepezil 10 milliGRAM(s) Oral at bedtime  famotidine    Tablet 20 milliGRAM(s) Oral daily  fluticasone propionate 50 MICROgram(s)/spray Nasal Spray 1 Spray(s) Both Nostrils daily  heparin  Injectable 5000 Unit(s) SubCutaneous every 12 hours  lactulose Syrup 10 Gram(s) Oral two times a day  levothyroxine 100 MICROGram(s) Oral daily  loratadine 10 milliGRAM(s) Oral daily  losartan 50 milliGRAM(s) Oral daily  memantine 5 milliGRAM(s) Oral two times a day  methylPREDNISolone sodium succinate Injectable 40 milliGRAM(s) IV Push every 8 hours  QUEtiapine 12.5 milliGRAM(s) Oral at bedtime  senna 2 Tablet(s) Oral at bedtime  simvastatin 10 milliGRAM(s) Oral at bedtime  verapamil  milliGRAM(s) Oral daily      LABS: All Labs Reviewed:                        13.5   11.52 )-----------( 226      ( 01 Mar 2020 06:37 )             40.8     03-01    142  |  110<H>  |  46<H>  ----------------------------<  167<H>  3.9   |  26  |  1.03    Ca    9.2      01 Mar 2020 06:37    TPro  7.8  /  Alb  3.1<L>  /  TBili  0.3  /  DBili  x   /  AST  19  /  ALT  18  /  AlkPhos  86  02-28    PT/INR - ( 28 Feb 2020 14:31 )   PT: 12.2 sec;   INR: 1.10 ratio         PTT - ( 28 Feb 2020 14:31 )  PTT:29.3 sec

## 2020-03-02 LAB
ANION GAP SERPL CALC-SCNC: 6 MMOL/L — SIGNIFICANT CHANGE UP (ref 5–17)
BUN SERPL-MCNC: 43 MG/DL — HIGH (ref 7–23)
CALCIUM SERPL-MCNC: 9.1 MG/DL — SIGNIFICANT CHANGE UP (ref 8.5–10.1)
CHLORIDE SERPL-SCNC: 111 MMOL/L — HIGH (ref 96–108)
CO2 SERPL-SCNC: 26 MMOL/L — SIGNIFICANT CHANGE UP (ref 22–31)
CREAT SERPL-MCNC: 0.9 MG/DL — SIGNIFICANT CHANGE UP (ref 0.5–1.3)
GLUCOSE SERPL-MCNC: 172 MG/DL — HIGH (ref 70–99)
HCT VFR BLD CALC: 41.7 % — SIGNIFICANT CHANGE UP (ref 34.5–45)
HGB BLD-MCNC: 13.4 G/DL — SIGNIFICANT CHANGE UP (ref 11.5–15.5)
MCHC RBC-ENTMCNC: 31.8 PG — SIGNIFICANT CHANGE UP (ref 27–34)
MCHC RBC-ENTMCNC: 32.1 GM/DL — SIGNIFICANT CHANGE UP (ref 32–36)
MCV RBC AUTO: 98.8 FL — SIGNIFICANT CHANGE UP (ref 80–100)
PLATELET # BLD AUTO: 219 K/UL — SIGNIFICANT CHANGE UP (ref 150–400)
POTASSIUM SERPL-MCNC: 4 MMOL/L — SIGNIFICANT CHANGE UP (ref 3.5–5.3)
POTASSIUM SERPL-SCNC: 4 MMOL/L — SIGNIFICANT CHANGE UP (ref 3.5–5.3)
RBC # BLD: 4.22 M/UL — SIGNIFICANT CHANGE UP (ref 3.8–5.2)
RBC # FLD: 12.1 % — SIGNIFICANT CHANGE UP (ref 10.3–14.5)
SODIUM SERPL-SCNC: 143 MMOL/L — SIGNIFICANT CHANGE UP (ref 135–145)
WBC # BLD: 10.97 K/UL — HIGH (ref 3.8–10.5)
WBC # FLD AUTO: 10.97 K/UL — HIGH (ref 3.8–10.5)

## 2020-03-02 PROCEDURE — 99232 SBSQ HOSP IP/OBS MODERATE 35: CPT | Mod: GC

## 2020-03-02 RX ORDER — CEFEPIME 1 G/1
1000 INJECTION, POWDER, FOR SOLUTION INTRAMUSCULAR; INTRAVENOUS ONCE
Refills: 0 | Status: COMPLETED | OUTPATIENT
Start: 2020-03-02 | End: 2020-03-02

## 2020-03-02 RX ADMIN — Medication 1 SPRAY(S): at 11:52

## 2020-03-02 RX ADMIN — CEFEPIME 1000 MILLIGRAM(S): 1 INJECTION, POWDER, FOR SOLUTION INTRAMUSCULAR; INTRAVENOUS at 18:20

## 2020-03-02 RX ADMIN — DONEPEZIL HYDROCHLORIDE 10 MILLIGRAM(S): 10 TABLET, FILM COATED ORAL at 23:15

## 2020-03-02 RX ADMIN — Medication 0.5 MILLIGRAM(S): at 18:24

## 2020-03-02 RX ADMIN — LOSARTAN POTASSIUM 50 MILLIGRAM(S): 100 TABLET, FILM COATED ORAL at 06:24

## 2020-03-02 RX ADMIN — Medication 40 MILLIGRAM(S): at 06:26

## 2020-03-02 RX ADMIN — QUETIAPINE FUMARATE 12.5 MILLIGRAM(S): 200 TABLET, FILM COATED ORAL at 23:14

## 2020-03-02 RX ADMIN — Medication 650 MILLIGRAM(S): at 18:21

## 2020-03-02 RX ADMIN — Medication 240 MILLIGRAM(S): at 06:24

## 2020-03-02 RX ADMIN — FAMOTIDINE 20 MILLIGRAM(S): 10 INJECTION INTRAVENOUS at 11:52

## 2020-03-02 RX ADMIN — HEPARIN SODIUM 5000 UNIT(S): 5000 INJECTION INTRAVENOUS; SUBCUTANEOUS at 06:26

## 2020-03-02 RX ADMIN — MEMANTINE HYDROCHLORIDE 5 MILLIGRAM(S): 10 TABLET ORAL at 23:14

## 2020-03-02 RX ADMIN — HEPARIN SODIUM 5000 UNIT(S): 5000 INJECTION INTRAVENOUS; SUBCUTANEOUS at 18:21

## 2020-03-02 RX ADMIN — Medication 3 MILLILITER(S): at 20:00

## 2020-03-02 RX ADMIN — SIMVASTATIN 10 MILLIGRAM(S): 20 TABLET, FILM COATED ORAL at 23:15

## 2020-03-02 RX ADMIN — Medication 3 MILLILITER(S): at 08:21

## 2020-03-02 RX ADMIN — LACTULOSE 10 GRAM(S): 10 SOLUTION ORAL at 18:21

## 2020-03-02 RX ADMIN — Medication 1 TABLET(S): at 13:20

## 2020-03-02 RX ADMIN — Medication 1000 UNIT(S): at 11:52

## 2020-03-02 RX ADMIN — LACTULOSE 10 GRAM(S): 10 SOLUTION ORAL at 06:23

## 2020-03-02 RX ADMIN — Medication 40 MILLIGRAM(S): at 14:09

## 2020-03-02 RX ADMIN — MEMANTINE HYDROCHLORIDE 5 MILLIGRAM(S): 10 TABLET ORAL at 06:24

## 2020-03-02 RX ADMIN — Medication 60 MILLIGRAM(S): at 18:23

## 2020-03-02 RX ADMIN — LORATADINE 10 MILLIGRAM(S): 10 TABLET ORAL at 13:20

## 2020-03-02 RX ADMIN — AZITHROMYCIN 255 MILLIGRAM(S): 500 TABLET, FILM COATED ORAL at 23:15

## 2020-03-02 RX ADMIN — Medication 0.5 MILLIGRAM(S): at 07:58

## 2020-03-02 RX ADMIN — SENNA PLUS 2 TABLET(S): 8.6 TABLET ORAL at 23:15

## 2020-03-02 RX ADMIN — Medication 100 MICROGRAM(S): at 06:26

## 2020-03-02 RX ADMIN — CEFEPIME 1000 MILLIGRAM(S): 1 INJECTION, POWDER, FOR SOLUTION INTRAMUSCULAR; INTRAVENOUS at 11:52

## 2020-03-02 NOTE — PROGRESS NOTE ADULT - SUBJECTIVE AND OBJECTIVE BOX
Pt has been seen and examined with FP resident, resident supervised agree with a/p       Patient is a 89y old  Female who presents with a chief complaint of PNA (28 Feb 2020 20:05)          PHYSICAL EXAM:    general- comfortable   -rs-aeeb, wheeze present b/l   -cvs-s1s2 normal   -p/a-soft,bs+        A/P    #ct iv steroids, supportive care     #dvt pr     #discharge plan

## 2020-03-02 NOTE — PROGRESS NOTE ADULT - ASSESSMENT
89 yo female with h/o HTN, hypothyroidism, HLD, allergic rhinitis, chronic pain, Osteoarthritis, constipation, vit D deficiency, osteoporosis, GERD, COPD, dementia, anxiety, and dysphagia sent in from [facility] for evaluation of worsening cough with outpt CXR concerning for b/l perihilar opacities, pt found to have O2 desats in the ED, and with + coronavirus, admitted for acute hypoxic respiratory failure secondary to acute viral PNA vs. CAP with COPD exacerbation:    # Acute hypoxic respiratory failure 2/2 viral PNA and superimposed bacterial CAP and COPD exacerbation  - CT chest on admission as above  - PNA concerning for CAP as pt w/o recent abx use, travel or exposure to anyone with recent travel, no recent hospitalization as per NH staff  - Lactate on admission WNL  - RVP with +coronavirus  - Continue on cefepime and azithromycin, cefipime dose increased to 2 mg 2 times a dayfor renal dosing as per pharmacy  - Bcx: NGTD  - Duonebs Q6hr  - Cotnitnue Albuterol q6 PRN  - Continue Solumedrol 40 q8  - Continue supplemental O2 to 90% (caution with over-oxygenating in COPD)  - Chest PT  - Pulse ox q4 hrs  - Patient doing well clinically, will plan for dc to home tomorrow    # UTI  - no new fevers  - Ucx :  >100,000 gram neg rods, e.coli suspected, speciation however pending  - will continue Cefepime      #HTN  - High this AM, better this afternoon in  the 120's  - Continue home med verapamil and losaratan 50 mg QD  - Will continue to monitor BP's    #HLD  -Continue home meds- simvastatin    # Hx of dysphagia and choking with eating  - speech/swallow eval - regular DASH/TLC diet with thin liquid consistency    #Heart murmur  - ECHO 2/29 reviewed : normal EF 70-75%, mild aortic regurg, mild aortic stenosis, mild MR regurg  - Recommend outpatient follow up with PCP    # KLAUDIA vs CKD  - NH documentation does not mention CKD  - Cr continuing to improve this AM  -continue with Renal dosing medication  - will continue to follow RFT's with morning labs    # Hypothyroidism  -Continue home dose synthroid 100    # Ascending aortic aneurysm  -Incidental finding on CT chest  -4.4 cm diameter    # GERD  -Decreased H2 blocker dose for decreased GFR once daily    # Dementia/anxiety  -Continue home meds - seroquel, memantine, xanax    # Ambulates with wheelchair  - PT eval pending    # DVT ppx  Heparin 5000 q12, SCDs      # Advanced directives  FULL CODE, MOLST completed    Dispo: possible dc to NH tomorrow, will follow up with ALEKSANDR Duarte

## 2020-03-02 NOTE — PROGRESS NOTE ADULT - SUBJECTIVE AND OBJECTIVE BOX
89 yo female with h/o HTN, hypothyroidism, HLD, allergic rhinitis, chronic pain, Osteoarthritis, constipation, vit D deficiency, osteoporosis, GERD, COPD, dementia, anxiety, and dysphagia sent in from [facility] for evaluation of worsening cough with outpt CXR concerning for b/l perihilar opacities, pt found to have O2 desats in the ED, and with + coronavirus, admitted for acute hypoxic respiratory failure secondary to acute viral PNA vs. CAP with COPD exacerbation. Currently managed with O2 supplementation, IV steroids, Duonebs, and supportive care. UTI on admission, on Cefipime, dose increased today 3/2.    SUBJECTIVE: SUBJECTIVE: Patient was seen and examined this AM. Breathing comfortably on RA. Agitated overnight, received 1 time dose of Haldol. No other issues.     REVIEW OF SYSTEMS: Unable to assess given patient mental status- A times O times 1    Vital Signs Last 24 Hrs  T(C): 36.8 (02 Mar 2020 11:00), Max: 36.8 (02 Mar 2020 11:00)  T(F): 98.2 (02 Mar 2020 11:00), Max: 98.2 (02 Mar 2020 11:00)  HR: 61 (02 Mar 2020 11:00) (52 - 74)  BP: 126/61 (02 Mar 2020 11:00) (126/61 - 173/92)  RR: 18 (02 Mar 2020 11:00) (17 - 18)  SpO2: 95% (02 Mar 2020 11:00) (92% - 95%)    I&O's Summary    02 Mar 2020 07:01  -  02 Mar 2020 16:38  --------------------------------------------------------  IN: 290 mL / OUT: 0 mL / NET: 290 mL      PHYSICAL EXAM:  Constitutional: elderly frail-appearing female, in NAD, awake, alert  HEENT: PERR, EOMI, Normal Hearing, MMM  Neck: Soft and supple, No LAD, No JVD  Respiratory: good respiratory effort, scattered expiratory wheezes throughout anterior and posterior lung fields  Cardiovascular: S1 and S2, regular rate and rhythm, no Murmurs, gallops or rubs  Gastrointestinal: Bowel Sounds present, soft, nontender, nondistended, no guarding, no rebound  Extremities: No peripheral edema  Vascular: 2+ peripheral pulses  Neurological: A/O x 1-2, no focal deficits  Musculoskeletal: 5/5 strength b/l upper and lower extremities  Skin: No rashes    MEDICATIONS:  MEDICATIONS  (STANDING):  albuterol/ipratropium for Nebulization 3 milliLiter(s) Nebulizer every 6 hours  ALPRAZolam 0.5 milliGRAM(s) Oral two times a day  azithromycin  IVPB      azithromycin  IVPB 500 milliGRAM(s) IV Intermittent every 24 hours  calcium carbonate 1250 mG  + Vitamin D (OsCal 500 + D) 1 Tablet(s) Oral daily  cefepime  Injectable. 1000 milliGRAM(s) IV Push two times a day  cholecalciferol 1000 Unit(s) Oral daily  donepezil 10 milliGRAM(s) Oral at bedtime  famotidine    Tablet 20 milliGRAM(s) Oral daily  fluticasone propionate 50 MICROgram(s)/spray Nasal Spray 1 Spray(s) Both Nostrils daily  heparin  Injectable 5000 Unit(s) SubCutaneous every 12 hours  lactulose Syrup 10 Gram(s) Oral two times a day  levothyroxine 100 MICROGram(s) Oral daily  loratadine 10 milliGRAM(s) Oral daily  losartan 50 milliGRAM(s) Oral daily  memantine 5 milliGRAM(s) Oral two times a day  methylPREDNISolone sodium succinate Injectable 40 milliGRAM(s) IV Push every 8 hours  QUEtiapine 12.5 milliGRAM(s) Oral at bedtime  senna 2 Tablet(s) Oral at bedtime  simvastatin 10 milliGRAM(s) Oral at bedtime  verapamil  milliGRAM(s) Oral daily      LABS: All Labs Reviewed:                        13.4   10.97 )-----------( 219      ( 02 Mar 2020 06:22 )             41.7     03-02    143  |  111<H>  |  43<H>  ----------------------------<  172<H>  4.0   |  26  |  0.90    Ca    9.1      02 Mar 2020 06:22      Blood Culture: 02-28 @ 14:31  Organism Klebsiella pneumoniae  Gram Stain Blood -- Gram Stain --  Specimen Source .Blood Blood-Peripheral  Culture-Blood --

## 2020-03-03 LAB
ANION GAP SERPL CALC-SCNC: 6 MMOL/L — SIGNIFICANT CHANGE UP (ref 5–17)
BUN SERPL-MCNC: 30 MG/DL — HIGH (ref 7–23)
CALCIUM SERPL-MCNC: 9.1 MG/DL — SIGNIFICANT CHANGE UP (ref 8.5–10.1)
CHLORIDE SERPL-SCNC: 111 MMOL/L — HIGH (ref 96–108)
CO2 SERPL-SCNC: 27 MMOL/L — SIGNIFICANT CHANGE UP (ref 22–31)
CREAT SERPL-MCNC: 0.83 MG/DL — SIGNIFICANT CHANGE UP (ref 0.5–1.3)
GLUCOSE SERPL-MCNC: 172 MG/DL — HIGH (ref 70–99)
HCT VFR BLD CALC: 41 % — SIGNIFICANT CHANGE UP (ref 34.5–45)
HGB BLD-MCNC: 13.3 G/DL — SIGNIFICANT CHANGE UP (ref 11.5–15.5)
MCHC RBC-ENTMCNC: 31.2 PG — SIGNIFICANT CHANGE UP (ref 27–34)
MCHC RBC-ENTMCNC: 32.4 GM/DL — SIGNIFICANT CHANGE UP (ref 32–36)
MCV RBC AUTO: 96.2 FL — SIGNIFICANT CHANGE UP (ref 80–100)
PLATELET # BLD AUTO: 215 K/UL — SIGNIFICANT CHANGE UP (ref 150–400)
POTASSIUM SERPL-MCNC: 3.8 MMOL/L — SIGNIFICANT CHANGE UP (ref 3.5–5.3)
POTASSIUM SERPL-SCNC: 3.8 MMOL/L — SIGNIFICANT CHANGE UP (ref 3.5–5.3)
RBC # BLD: 4.26 M/UL — SIGNIFICANT CHANGE UP (ref 3.8–5.2)
RBC # FLD: 11.9 % — SIGNIFICANT CHANGE UP (ref 10.3–14.5)
SODIUM SERPL-SCNC: 144 MMOL/L — SIGNIFICANT CHANGE UP (ref 135–145)
WBC # BLD: 13.36 K/UL — HIGH (ref 3.8–10.5)
WBC # FLD AUTO: 13.36 K/UL — HIGH (ref 3.8–10.5)

## 2020-03-03 PROCEDURE — 99232 SBSQ HOSP IP/OBS MODERATE 35: CPT | Mod: GC

## 2020-03-03 RX ORDER — LOSARTAN POTASSIUM 100 MG/1
50 TABLET, FILM COATED ORAL ONCE
Refills: 0 | Status: COMPLETED | OUTPATIENT
Start: 2020-03-03 | End: 2020-03-03

## 2020-03-03 RX ORDER — VERAPAMIL HCL 240 MG
240 CAPSULE, EXTENDED RELEASE PELLETS 24 HR ORAL ONCE
Refills: 0 | Status: COMPLETED | OUTPATIENT
Start: 2020-03-03 | End: 2020-03-03

## 2020-03-03 RX ORDER — IPRATROPIUM/ALBUTEROL SULFATE 18-103MCG
3 AEROSOL WITH ADAPTER (GRAM) INHALATION ONCE
Refills: 0 | Status: COMPLETED | OUTPATIENT
Start: 2020-03-03 | End: 2020-03-03

## 2020-03-03 RX ORDER — HYDRALAZINE HCL 50 MG
5 TABLET ORAL ONCE
Refills: 0 | Status: COMPLETED | OUTPATIENT
Start: 2020-03-03 | End: 2020-03-03

## 2020-03-03 RX ADMIN — Medication 1000 UNIT(S): at 11:09

## 2020-03-03 RX ADMIN — DONEPEZIL HYDROCHLORIDE 10 MILLIGRAM(S): 10 TABLET, FILM COATED ORAL at 21:08

## 2020-03-03 RX ADMIN — HEPARIN SODIUM 5000 UNIT(S): 5000 INJECTION INTRAVENOUS; SUBCUTANEOUS at 17:59

## 2020-03-03 RX ADMIN — MEMANTINE HYDROCHLORIDE 5 MILLIGRAM(S): 10 TABLET ORAL at 17:53

## 2020-03-03 RX ADMIN — LACTULOSE 10 GRAM(S): 10 SOLUTION ORAL at 17:54

## 2020-03-03 RX ADMIN — MEMANTINE HYDROCHLORIDE 5 MILLIGRAM(S): 10 TABLET ORAL at 11:01

## 2020-03-03 RX ADMIN — QUETIAPINE FUMARATE 12.5 MILLIGRAM(S): 200 TABLET, FILM COATED ORAL at 21:08

## 2020-03-03 RX ADMIN — HEPARIN SODIUM 5000 UNIT(S): 5000 INJECTION INTRAVENOUS; SUBCUTANEOUS at 06:33

## 2020-03-03 RX ADMIN — CEFEPIME 1000 MILLIGRAM(S): 1 INJECTION, POWDER, FOR SOLUTION INTRAMUSCULAR; INTRAVENOUS at 17:53

## 2020-03-03 RX ADMIN — SIMVASTATIN 10 MILLIGRAM(S): 20 TABLET, FILM COATED ORAL at 21:08

## 2020-03-03 RX ADMIN — Medication 3 MILLILITER(S): at 08:22

## 2020-03-03 RX ADMIN — LOSARTAN POTASSIUM 50 MILLIGRAM(S): 100 TABLET, FILM COATED ORAL at 12:38

## 2020-03-03 RX ADMIN — Medication 5 MILLIGRAM(S): at 07:00

## 2020-03-03 RX ADMIN — CEFEPIME 1000 MILLIGRAM(S): 1 INJECTION, POWDER, FOR SOLUTION INTRAMUSCULAR; INTRAVENOUS at 06:33

## 2020-03-03 RX ADMIN — Medication 3 MILLILITER(S): at 02:49

## 2020-03-03 RX ADMIN — LORATADINE 10 MILLIGRAM(S): 10 TABLET ORAL at 11:09

## 2020-03-03 RX ADMIN — Medication 60 MILLIGRAM(S): at 11:01

## 2020-03-03 RX ADMIN — Medication 240 MILLIGRAM(S): at 12:38

## 2020-03-03 RX ADMIN — SENNA PLUS 2 TABLET(S): 8.6 TABLET ORAL at 21:08

## 2020-03-03 RX ADMIN — FAMOTIDINE 20 MILLIGRAM(S): 10 INJECTION INTRAVENOUS at 11:09

## 2020-03-03 RX ADMIN — Medication 0.5 MILLIGRAM(S): at 17:59

## 2020-03-03 RX ADMIN — AZITHROMYCIN 255 MILLIGRAM(S): 500 TABLET, FILM COATED ORAL at 21:08

## 2020-03-03 NOTE — PROGRESS NOTE ADULT - ASSESSMENT
89 yo female with h/o HTN, hypothyroidism, HLD, allergic rhinitis, chronic pain, Osteoarthritis, constipation, vit D deficiency, osteoporosis, GERD, COPD, dementia, anxiety, and dysphagia sent in from [facility] for evaluation of worsening cough with outpt CXR concerning for b/l perihilar opacities, pt found to have O2 desats in the ED, and with + coronavirus, admitted for acute hypoxic respiratory failure secondary to acute viral PNA vs. CAP with COPD exacerbation:    # Acute hypoxic respiratory failure 2/2 viral PNA and superimposed bacterial CAP and COPD exacerbation  - Stat 1 time dose of Duoneb treatment and prednisone given for increased wheezing this AM, will re-assess patient  - CT chest on admission as above  - PNA concerning for CAP as pt w/o recent abx use, travel or exposure to anyone with recent travel, no recent hospitalization as per NH staff  - Lactate on admission WNL  - RVP with +coronavirus  - Continue on cefepime 2 mg q 12 and azithromycin  - Bcx: NGTD  - Duonebs Q6hr  - Cotnitnue Albuterol q6 PRN  - Continue Solumedrol 40 q8  - Continue supplemental O2 to 90% (caution with over-oxygenating in COPD)  - Chest PT  - Pulse ox q4 hrs  - Patient doing well clinically, will plan for dc to home tomorrow    #HTN  - High this AM, s/p 1 time dose of IV hydralazine, patient refusing AM po meds, will attempt again and re-check, if no improvement will switch meds to IV  - Continue home med verapamil and losaratan 50 mg QD  - Will continue to monitor BP's    # UTI  - no new fevers  - Ucx :  >100,000 gram neg rods, e.coli suspected, speciation however pending  - will continue Cefepime      #HLD  -Continue home meds- simvastatin    # Hx of dysphagia and choking with eating  - speech/swallow eval - regular DASH/TLC diet with thin liquid consistency    #Heart murmur  - ECHO 2/29 reviewed : normal EF 70-75%, mild aortic regurg, mild aortic stenosis, mild MR regurg  - Recommend outpatient follow up with PCP    # KLAUDIA vs CKD  - NH documentation does not mention CKD  - Cr continuing to improve this AM  -continue with Renal dosing medication  - will continue to follow RFT's with morning labs    # Hypothyroidism  -Continue home dose synthroid 100    # Ascending aortic aneurysm  -Incidental finding on CT chest  -4.4 cm diameter    # GERD  -Decreased H2 blocker dose for decreased GFR once daily    # Dementia/anxiety  -Continue home meds - seroquel, memantine, xanax    # Ambulates with wheelchair  - PT eval pending    # DVT ppx  Heparin 5000 q12, SCDs      # Advanced directives  FULL CODE, MOLST completed    Dispo: Continued hospitalization for better control of BP, will follow up with SW for DC planning    Case dw Dr. Duarte

## 2020-03-03 NOTE — PROGRESS NOTE ADULT - ATTENDING COMMENTS
on exam- comfortable   -rs-aeeb, cta  -p/a-soft, bs+  -cvs-s1s2 normal     #ct supportive care, discharge plan for tomorrow if remains stable

## 2020-03-03 NOTE — PROGRESS NOTE ADULT - SUBJECTIVE AND OBJECTIVE BOX
HPI: 89 yo female with h/o HTN, hypothyroidism, HLD, allergic rhinitis, chronic pain, Osteoarthritis, constipation, vit D deficiency, osteoporosis, GERD, COPD, dementia, anxiety, and dysphagia sent in from [facility] for evaluation of worsening cough with outpt CXR concerning for b/l perihilar opacities, pt found to have O2 desats in the ED, and with + coronavirus, admitted for acute hypoxic respiratory failure secondary to acute viral PNA vs. CAP with COPD exacerbation. Currently managed with O2 supplementation, IV steroids, Duonebs, and supportive care. UTI on admission, on Cefipime.    SUBJECTIVE: Patient was seen and examined this AM. Hypertensive overnight, treated with IV hydralazine. Refusing AM PO antihypertensive medications. No other acute issues.    REVIEW OF SYSTEMS: Unable to assess given patient mental status- A times O times 1    Vital Signs Last 24 Hrs  T(C): 36.3 (03 Mar 2020 11:53), Max: 37 (02 Mar 2020 21:27)  T(F): 97.4 (03 Mar 2020 11:53), Max: 98.6 (02 Mar 2020 21:27)  HR: 73 (03 Mar 2020 11:53) (54 - 73)  BP: 176/100 (03 Mar 2020 11:53) (172/86 - 208/94)  RR: 19 (03 Mar 2020 11:53) (17 - 19)  SpO2: 92% (03 Mar 2020 11:53) (91% - 93%)    I&O's Summary    02 Mar 2020 07:01  -  03 Mar 2020 07:00  --------------------------------------------------------  IN: 290 mL / OUT: 0 mL / NET: 290 mL    PHYSICAL EXAM:  Constitutional: elderly frail-appearing female, in NAD, awake, alert  HEENT: PERR, EOMI, Normal Hearing, MMM  Neck: Soft and supple, No LAD, No JVD  Respiratory: scattered expiratory wheezes throughout anterior and posterior lung fields  Cardiovascular: S1 and S2, regular rate and rhythm, no Murmurs, gallops or rubs  Gastrointestinal: Bowel Sounds present, soft, nontender, nondistended, no guarding, no rebound  Extremities: No peripheral edema  Vascular: 2+ peripheral pulses  Neurological: A/O x 1-2, no focal deficits  Musculoskeletal: 5/5 strength b/l upper and lower extremities  Skin: No rashes    MEDICATIONS:  MEDICATIONS  (STANDING):  albuterol/ipratropium for Nebulization 3 milliLiter(s) Nebulizer every 6 hours  ALPRAZolam 0.5 milliGRAM(s) Oral two times a day  azithromycin  IVPB      azithromycin  IVPB 500 milliGRAM(s) IV Intermittent every 24 hours  calcium carbonate 1250 mG  + Vitamin D (OsCal 500 + D) 1 Tablet(s) Oral daily  cefepime  Injectable. 1000 milliGRAM(s) IV Push two times a day  cholecalciferol 1000 Unit(s) Oral daily  donepezil 10 milliGRAM(s) Oral at bedtime  famotidine    Tablet 20 milliGRAM(s) Oral daily  fluticasone propionate 50 MICROgram(s)/spray Nasal Spray 1 Spray(s) Both Nostrils daily  heparin  Injectable 5000 Unit(s) SubCutaneous every 12 hours  lactulose Syrup 10 Gram(s) Oral two times a day  levothyroxine 100 MICROGram(s) Oral daily  loratadine 10 milliGRAM(s) Oral daily  losartan 50 milliGRAM(s) Oral daily  memantine 5 milliGRAM(s) Oral two times a day  predniSONE   Tablet 60 milliGRAM(s) Oral every 12 hours  QUEtiapine 12.5 milliGRAM(s) Oral at bedtime  senna 2 Tablet(s) Oral at bedtime  simvastatin 10 milliGRAM(s) Oral at bedtime  verapamil  milliGRAM(s) Oral daily      LABS: All Labs Reviewed:                        13.3   13.36 )-----------( 215      ( 03 Mar 2020 07:30 )             41.0     03-03    144  |  111<H>  |  30<H>  ----------------------------<  172<H>  3.8   |  27  |  0.83    Ca    9.1      03 Mar 2020 07:30

## 2020-03-04 LAB
ANION GAP SERPL CALC-SCNC: 6 MMOL/L — SIGNIFICANT CHANGE UP (ref 5–17)
BUN SERPL-MCNC: 32 MG/DL — HIGH (ref 7–23)
CALCIUM SERPL-MCNC: 8.8 MG/DL — SIGNIFICANT CHANGE UP (ref 8.5–10.1)
CHLORIDE SERPL-SCNC: 109 MMOL/L — HIGH (ref 96–108)
CO2 SERPL-SCNC: 27 MMOL/L — SIGNIFICANT CHANGE UP (ref 22–31)
CREAT SERPL-MCNC: 0.75 MG/DL — SIGNIFICANT CHANGE UP (ref 0.5–1.3)
CULTURE RESULTS: SIGNIFICANT CHANGE UP
CULTURE RESULTS: SIGNIFICANT CHANGE UP
GLUCOSE SERPL-MCNC: 138 MG/DL — HIGH (ref 70–99)
HCT VFR BLD CALC: 40.2 % — SIGNIFICANT CHANGE UP (ref 34.5–45)
HGB BLD-MCNC: 13.3 G/DL — SIGNIFICANT CHANGE UP (ref 11.5–15.5)
MCHC RBC-ENTMCNC: 32 PG — SIGNIFICANT CHANGE UP (ref 27–34)
MCHC RBC-ENTMCNC: 33.1 GM/DL — SIGNIFICANT CHANGE UP (ref 32–36)
MCV RBC AUTO: 96.9 FL — SIGNIFICANT CHANGE UP (ref 80–100)
PLATELET # BLD AUTO: 214 K/UL — SIGNIFICANT CHANGE UP (ref 150–400)
POTASSIUM SERPL-MCNC: 3.6 MMOL/L — SIGNIFICANT CHANGE UP (ref 3.5–5.3)
POTASSIUM SERPL-SCNC: 3.6 MMOL/L — SIGNIFICANT CHANGE UP (ref 3.5–5.3)
RBC # BLD: 4.15 M/UL — SIGNIFICANT CHANGE UP (ref 3.8–5.2)
RBC # FLD: 12.2 % — SIGNIFICANT CHANGE UP (ref 10.3–14.5)
SODIUM SERPL-SCNC: 142 MMOL/L — SIGNIFICANT CHANGE UP (ref 135–145)
SPECIMEN SOURCE: SIGNIFICANT CHANGE UP
SPECIMEN SOURCE: SIGNIFICANT CHANGE UP
WBC # BLD: 12.24 K/UL — HIGH (ref 3.8–10.5)
WBC # FLD AUTO: 12.24 K/UL — HIGH (ref 3.8–10.5)

## 2020-03-04 PROCEDURE — 99232 SBSQ HOSP IP/OBS MODERATE 35: CPT | Mod: GC

## 2020-03-04 RX ORDER — HYDRALAZINE HCL 50 MG
5 TABLET ORAL ONCE
Refills: 0 | Status: COMPLETED | OUTPATIENT
Start: 2020-03-04 | End: 2020-03-04

## 2020-03-04 RX ADMIN — HEPARIN SODIUM 5000 UNIT(S): 5000 INJECTION INTRAVENOUS; SUBCUTANEOUS at 06:49

## 2020-03-04 RX ADMIN — AZITHROMYCIN 255 MILLIGRAM(S): 500 TABLET, FILM COATED ORAL at 23:07

## 2020-03-04 RX ADMIN — Medication 5 MILLIGRAM(S): at 06:49

## 2020-03-04 RX ADMIN — Medication 3 MILLILITER(S): at 20:33

## 2020-03-04 RX ADMIN — CEFEPIME 1000 MILLIGRAM(S): 1 INJECTION, POWDER, FOR SOLUTION INTRAMUSCULAR; INTRAVENOUS at 06:49

## 2020-03-04 RX ADMIN — CEFEPIME 1000 MILLIGRAM(S): 1 INJECTION, POWDER, FOR SOLUTION INTRAMUSCULAR; INTRAVENOUS at 17:22

## 2020-03-04 NOTE — PROGRESS NOTE ADULT - ASSESSMENT
89 yo female with h/o HTN, hypothyroidism, HLD, allergic rhinitis, chronic pain, Osteoarthritis, constipation, vit D deficiency, osteoporosis, GERD, COPD, dementia, anxiety, and dysphagia sent in from [facility] for evaluation of worsening cough with outpt CXR concerning for b/l perihilar opacities, pt found to have O2 desats in the ED, and with + coronavirus, admitted for acute hypoxic respiratory failure secondary to acute viral PNA vs. CAP with COPD exacerbation:    # Acute hypoxic respiratory failure 2/2 viral PNA and superimposed bacterial CAP and COPD exacerbation  - Oxygen saturation above 90% on RA  - Breathing comfortable  - Clinically improved  - RVP with +coronavirus  - Continue on cefepime 2 mg q 12 and azithromycin, Day #5 of abx, will switch to orals on discharge to complete 10-14 day course.  - Bcx: NGTD  - Duonebs Q6hr  - Continue Albuterol q6 PRN  - Switched to oral prednisone 60 mg QD  - Continue supplemental O2 to 90% (caution with over-oxygenating in COPD)  - Chest PT  - Pulse ox q4 hrs      #HTN  - High above 170s'-180s this AM, s/p 1 time dose of IV hydralazine, patient refusing AM po meds  - Continue home med verapamil and losaratan 50 mg QD  - Will continue to monitor BP's    # UTI  - no new fevers  - Ucx :  >100,000 gram neg rods, e.coli suspected, speciation however pending  - will continue Cefepime, will switch to orals on discharge      #HLD  -Continue home meds- simvastatin    # Hx of dysphagia and choking with eating  - speech/swallow eval - regular DASH/TLC diet with thin liquid consistency    #Heart murmur  - ECHO 2/29 reviewed : normal EF 70-75%, mild aortic regurg, mild aortic stenosis, mild MR regurg  - Recommend outpatient follow up with PCP    # KLAUDIA vs CKD  - [facility] documentation does not mention CKD  - Cr continuing to improve this AM  -continue with Renal dosing medication  - will continue to follow RFT's with morning labs    # Hypothyroidism  -Continue home dose synthroid 100    # Ascending aortic aneurysm  -Incidental finding on CT chest  -4.4 cm diameter    # GERD  -Decreased H2 blocker dose for decreased GFR once daily    # Dementia/anxiety  -Continue home meds - seroquel, memantine, xanax    # Ambulates with wheelchair  - PT eval pending    # DVT ppx  Heparin 5000 q12, SCDs      # Advanced directives  FULL CODE, MOLST completed    Dispo: Continued hospitalization for better control of BP, will follow up with SW for DC planning    Case dw Dr. Lara

## 2020-03-04 NOTE — CHART NOTE - NSCHARTNOTEFT_GEN_A_CORE
Pt seen and examined with house staff.  Plan formulated and reviewed on rounds     Briefly, 89 y/o female with dementia, HTN, HL and COPD admitted with CAP sepsis, UTI and coronavirus and AECOPD  Has been hypertensive as she is not able to take meds so early    Awake and alert  Confused  sitting in chair  NAD    Make morning Meds administered at 0900  If BP stable, can plan for DC  Finish off 7 days of ABx (6)--can DC azithromycin today  OOB

## 2020-03-04 NOTE — PROGRESS NOTE ADULT - SUBJECTIVE AND OBJECTIVE BOX
HPI: 89 yo female with h/o HTN, hypothyroidism, HLD, allergic rhinitis, chronic pain, Osteoarthritis, constipation, vit D deficiency, osteoporosis, GERD, COPD, dementia, anxiety, and dysphagia sent in from [facility] for evaluation of worsening cough with outpt CXR concerning for b/l perihilar opacities, pt found to have O2 desats in the ED, and with + coronavirus, admitted for acute hypoxic respiratory failure secondary to acute viral PNA vs. CAP with COPD exacerbation. Currently managed with O2 supplementation, IV steroids, Duonebs, and supportive care. UTI on admission, on Cefipime/Azithromax.     SUBJECTIVE: Patient was seen and examined this AM. Overnight will elevated BP, refusing scheduled oral antihypertensives. Still refusing at time of rounds.    REVIEW OF SYSTEMS: Unable to assess given patient mental status- A times O times 1    Vital Signs Last 24 Hrs  T(C): 36.6 (04 Mar 2020 11:52), Max: 36.7 (04 Mar 2020 05:24)  T(F): 97.9 (04 Mar 2020 11:52), Max: 98 (04 Mar 2020 05:24)  HR: 78 (04 Mar 2020 11:52) (54 - 78)  BP: 179/88 (04 Mar 2020 11:52) (154/81 - 181/90)  RR: 20 (04 Mar 2020 11:52) (17 - 20)  SpO2: 91% (04 Mar 2020 11:52) (91% - 92%)      PHYSICAL EXAM:  Constitutional: elderly frail-appearing female, in NAD, awake, alert  HEENT: PERR, EOMI, Normal Hearing, MMM  Neck: Soft and supple, No LAD, No JVD  Respiratory: scattered expiratory wheezes throughout anterior and posterior lung fields  Cardiovascular: S1 and S2, regular rate and rhythm, no Murmurs, gallops or rubs  Gastrointestinal: Bowel Sounds present, soft, nontender, nondistended, no guarding, no rebound  Extremities: No peripheral edema  Vascular: 2+ peripheral pulses  Neurological: A/O x 1-2, no focal deficits  Musculoskeletal: 5/5 strength b/l upper and lower extremities  Skin: No rashes    MEDICATIONS:  MEDICATIONS  (STANDING):  albuterol/ipratropium for Nebulization 3 milliLiter(s) Nebulizer every 6 hours  ALPRAZolam 0.5 milliGRAM(s) Oral two times a day  azithromycin  IVPB      azithromycin  IVPB 500 milliGRAM(s) IV Intermittent every 24 hours  calcium carbonate 1250 mG  + Vitamin D (OsCal 500 + D) 1 Tablet(s) Oral daily  cefepime  Injectable. 1000 milliGRAM(s) IV Push two times a day  cholecalciferol 1000 Unit(s) Oral daily  donepezil 10 milliGRAM(s) Oral at bedtime  famotidine    Tablet 20 milliGRAM(s) Oral daily  fluticasone propionate 50 MICROgram(s)/spray Nasal Spray 1 Spray(s) Both Nostrils daily  heparin  Injectable 5000 Unit(s) SubCutaneous every 12 hours  lactulose Syrup 10 Gram(s) Oral two times a day  levothyroxine 100 MICROGram(s) Oral daily  loratadine 10 milliGRAM(s) Oral daily  losartan 50 milliGRAM(s) Oral daily  memantine 5 milliGRAM(s) Oral two times a day  predniSONE   Tablet 60 milliGRAM(s) Oral daily  QUEtiapine 12.5 milliGRAM(s) Oral at bedtime  senna 2 Tablet(s) Oral at bedtime  simvastatin 10 milliGRAM(s) Oral at bedtime  verapamil  milliGRAM(s) Oral daily      LABS: All Labs Reviewed:                        13.3   12.24 )-----------( 214      ( 04 Mar 2020 06:23 )             40.2     03-04    142  |  109<H>  |  32<H>  ----------------------------<  138<H>  3.6   |  27  |  0.75    Ca    8.8      04 Mar 2020 06:23

## 2020-03-05 ENCOUNTER — TRANSCRIPTION ENCOUNTER (OUTPATIENT)
Age: 85
End: 2020-03-05

## 2020-03-05 VITALS
SYSTOLIC BLOOD PRESSURE: 159 MMHG | RESPIRATION RATE: 20 BRPM | TEMPERATURE: 98 F | DIASTOLIC BLOOD PRESSURE: 88 MMHG | OXYGEN SATURATION: 93 % | HEART RATE: 65 BPM

## 2020-03-05 LAB
ANION GAP SERPL CALC-SCNC: 5 MMOL/L — SIGNIFICANT CHANGE UP (ref 5–17)
BUN SERPL-MCNC: 28 MG/DL — HIGH (ref 7–23)
CALCIUM SERPL-MCNC: 8.7 MG/DL — SIGNIFICANT CHANGE UP (ref 8.5–10.1)
CHLORIDE SERPL-SCNC: 110 MMOL/L — HIGH (ref 96–108)
CO2 SERPL-SCNC: 28 MMOL/L — SIGNIFICANT CHANGE UP (ref 22–31)
CREAT SERPL-MCNC: 0.7 MG/DL — SIGNIFICANT CHANGE UP (ref 0.5–1.3)
GLUCOSE SERPL-MCNC: 110 MG/DL — HIGH (ref 70–99)
HCT VFR BLD CALC: 40.1 % — SIGNIFICANT CHANGE UP (ref 34.5–45)
HGB BLD-MCNC: 13.1 G/DL — SIGNIFICANT CHANGE UP (ref 11.5–15.5)
MCHC RBC-ENTMCNC: 31.8 PG — SIGNIFICANT CHANGE UP (ref 27–34)
MCHC RBC-ENTMCNC: 32.7 GM/DL — SIGNIFICANT CHANGE UP (ref 32–36)
MCV RBC AUTO: 97.3 FL — SIGNIFICANT CHANGE UP (ref 80–100)
PLATELET # BLD AUTO: 203 K/UL — SIGNIFICANT CHANGE UP (ref 150–400)
POTASSIUM SERPL-MCNC: 4 MMOL/L — SIGNIFICANT CHANGE UP (ref 3.5–5.3)
POTASSIUM SERPL-SCNC: 4 MMOL/L — SIGNIFICANT CHANGE UP (ref 3.5–5.3)
RBC # BLD: 4.12 M/UL — SIGNIFICANT CHANGE UP (ref 3.8–5.2)
RBC # FLD: 12.2 % — SIGNIFICANT CHANGE UP (ref 10.3–14.5)
SODIUM SERPL-SCNC: 143 MMOL/L — SIGNIFICANT CHANGE UP (ref 135–145)
WBC # BLD: 12.93 K/UL — HIGH (ref 3.8–10.5)
WBC # FLD AUTO: 12.93 K/UL — HIGH (ref 3.8–10.5)

## 2020-03-05 PROCEDURE — 99239 HOSP IP/OBS DSCHRG MGMT >30: CPT | Mod: GC

## 2020-03-05 RX ADMIN — Medication 0.25 MILLIGRAM(S): at 17:55

## 2020-03-05 RX ADMIN — HEPARIN SODIUM 5000 UNIT(S): 5000 INJECTION INTRAVENOUS; SUBCUTANEOUS at 06:54

## 2020-03-05 RX ADMIN — CEFEPIME 1000 MILLIGRAM(S): 1 INJECTION, POWDER, FOR SOLUTION INTRAMUSCULAR; INTRAVENOUS at 06:55

## 2020-03-05 RX ADMIN — CEFEPIME 1000 MILLIGRAM(S): 1 INJECTION, POWDER, FOR SOLUTION INTRAMUSCULAR; INTRAVENOUS at 17:56

## 2020-03-05 NOTE — DISCHARGE NOTE PROVIDER - NSDCMRMEDTOKEN_GEN_ALL_CORE_FT
acetaminophen 500 mg oral tablet: 2 tab(s) orally 2 times a day  albuterol 2.5 mg/3 mL (0.083%) inhalation solution: 3 milliliter(s) inhaled every 8 hours  ALPRAZolam 0.5 mg oral tablet: 1 tab(s) orally 2 times a day  Calcium 500+D oral tablet, chewable: 1 tab(s) orally 2 times a day  cholecalciferol 1000 intl units (25 mcg) oral tablet: 1 tab(s) orally once a day  Claritin 10 mg oral tablet: 1 tab(s) orally once a day  docusate sodium 100 mg oral capsule: 2 cap(s) orally once a day  donepezil 10 mg oral tablet: 1 tab(s) orally once a day  famotidine 20 mg oral tablet: 1 tab(s) orally 2 times a day  fluticasone 50 mcg/inh nasal spray: 1 spray(s) in each nostril once a day  lactulose 10 g/15 mL oral solution: 15 milliliter(s) orally 2 times a day  levothyroxine 100 mcg (0.1 mg) oral tablet: 1 tab(s) orally once a day  losartan 50 mg oral tablet: 1 tab(s) orally once a day  memantine 14 mg oral capsule, extended release: 1 cap(s) orally once a day  QUEtiapine 25 mg oral tablet: 0.5 tab(s) orally 2 times a day  Senna 8.6 mg oral tablet: 2 tab(s) orally once a day (at bedtime)  simvastatin 10 mg oral tablet: 1 tab(s) orally once a day (at bedtime)  Stress Formula with Zinc oral tablet: 1 tab(s) orally once a day  Ventolin HFA 90 mcg/inh inhalation aerosol: 2 puff(s) inhaled 3 times a day  verapamil 240 mg/24 hours oral capsule, extended release: 1 cap(s) orally once a day

## 2020-03-05 NOTE — CHART NOTE - NSCHARTNOTEFT_GEN_A_CORE
Pt seen and examined with house staff.  Plan formulated and reviewed on rounds    Briefly, 89 y/o female with dementia, HTN, HL and COPD admitted with CAP sepsis, UTI and coronavirus and AECOPD  Has been hypertensive as she refuses to take meds which has been long standing  No events overnight     Awake and alert  Confused  sitting in chair  NAD    DC today  Cont tapering steroids as outpt  Can DC on no Abx

## 2020-03-05 NOTE — DISCHARGE NOTE NURSING/CASE MANAGEMENT/SOCIAL WORK - PATIENT PORTAL LINK FT
You can access the FollowMyHealth Patient Portal offered by [facility] by registering at the following website: [email or web address]. By joining [facility]’s FollowMyHealth portal, you will also be able to view your health information using other applications (apps) compatible with our system.

## 2020-03-05 NOTE — DISCHARGE NOTE PROVIDER - CARE PROVIDER_API CALL
Megan Summers)  Medicine  [address] [place] [id number]  Phone: [phone]  Fax: [phone]  Established Patient  Follow Up Time: 1 week

## 2020-03-05 NOTE — DISCHARGE NOTE PROVIDER - NSDCCPCAREPLAN_GEN_ALL_CORE_FT
PRINCIPAL DISCHARGE DIAGNOSIS  Diagnosis: Viral pneumonia  Assessment and Plan of Treatment: -Patient presented with worsening cough, shortness of breath, low oxygen saturation found to have positive RVP: coronavirus not associated with COVID 19  - Initiated on IV steroids, duonebs, oxygen supplementation, supportive care: Chest PT  - IV antibiotics: Azithromax and ceftriaxone added for superimposed CAP  - Patient clinically improved over hospital course  - Patient will not need to continue antibiotics  - Steroids switched to oral prednisone and patient will complete a tapered dose as prescribed.  -Recommend follow up with PCP: Dr. Megan Summers within 1 week after discharge.      SECONDARY DISCHARGE DIAGNOSES  Diagnosis: Hypertension  Assessment and Plan of Treatment: -Found to be hypertensive in 160's-170's on admission  - Home medications were continued: Verapamil 240 mg, Losartan 50 once daily  -Patient non-compliant during hospital day, however blood pressure control optimized with home medications which patient is to continue at same dose.  - Recommend follow up with PCP    Diagnosis: COPD exacerbation  Assessment and Plan of Treatment: - Patient also found to have COPD exacerbation which was treated with IV steroids, O2 supplementation, and duonebs.  - Patient clinically improved  - Patient to continue all home medications  - Recommend follow up with PCP    Diagnosis: Coronavirus infection  Assessment and Plan of Treatment: - RVP positive for coronavirus not associated with COVID 19.    Diagnosis: Heart murmur  Assessment and Plan of Treatment: - ECHO from 2/29 with normal EF, mild valvular changes  - Recommend outpatient follow up with PCP

## 2020-03-05 NOTE — DISCHARGE NOTE PROVIDER - HOSPITAL COURSE
Patient is a Patient is a 89 y/o female with medical history of COPD presented to the hospital on 2/28 sent in from [facility] for evaluation of worsening cough with outpatient chest x-ray concerning for b/l perihilar opacities. Patient was found to have oxygen desaturation,  + coronavirus, admitted for acute hypoxic respiratory failure secondary to acute viral PNA with superimposed CAP with COPD exacerbation. The patient was treated with IV antibiotics: Cefipime and azithromax, O2 supplementation, IV steroids, Duonebs, and supportive care. On admission, patient was also found to have UTI, later worked up to be UTI secondary to Klebsiella. RVP positive for Coronavirus not associated with COVID 19. During hospitalization, home blood pressure medications were continued, however patient remained non-compliant. Spoke with nephew regarding this, stated it had been issue in past. Blood pressure control better over hospital course. Patient clinically improved. Antibiotic treatment is completed and patient will not be sent home on antibiotics. IV steroids have been switched to oral which will be tapered after discharge: 50 mg x 2 days, 40, 30, 20, 10. Patient to continue all other home medications. Recommend follow up with PCP within 1 week after discharge. PCP office contacted, unable to reach.             Vital Signs Last 24 Hrs    T(C): 36.4 (05 Mar 2020 11:15), Max: 36.9 (04 Mar 2020 21:03)    T(F): 97.6 (05 Mar 2020 11:15), Max: 98.5 (04 Mar 2020 21:03)    HR: 65 (05 Mar 2020 11:15) (63 - 80)    BP: 159/88 (05 Mar 2020 11:15) (141/99 - 159/88)    RR: 20 (05 Mar 2020 11:15) (19 - 20)    SpO2: 93% (05 Mar 2020 11:15) (91% - 94%)            PHYSICAL EXAM:    Constitutional: NAD, awake and alert, well-developed    HEENT: PERR, EOMI, Normal Hearing, MMM    Neck: Soft and supple, No LAD, No JVD    Respiratory: Breath sounds are clear bilaterally, No wheezing, rales or rhonchi    Cardiovascular: S1 and S2, regular rate and rhythm, no Murmurs, gallops or rubs    Gastrointestinal: Bowel Sounds present, soft, nontender, nondistended, no guarding, no rebound    Extremities: No peripheral edema    Vascular: 2+ peripheral pulses    Neurological: A/O x 3, no focal deficits    Musculoskeletal: 5/5 strength b/l upper and lower extremities    Skin: No rashes        Coronavirus (229E,HKU1,NL63,OC43): Detected (02.28.20 @ 14:31), not associated with COVID 19            ECHO 2/29:     Findings         MitralValve     Fibrocalcific changes noted to the mitral valve leaflets with preserved     leaflet excursion.     There is calcification of anterior mitral valve leaflet. The leaflet     opening is normal.     Trace to mild mitral regurgitation is present.         Aortic Valve     Significant fibrocalcific changes noted to the aortic valve leaflets with     restriction in leaflet excursion. Calculated CHANDRAKANT is 1.8 cm2 this finding     is consistent with mild aortic stenosis.     Mild (1+) aortic regurgitation is present.        Tricuspid Valve     Mild (1+) tricuspid valve regurgitation is present.         Pulmonic Valve     Normal appearing pulmonic valve structure and function.         Left Atrium     The left atrium is mildly dilated.         Left Ventricle     Mild concentric left ventricular hypertrophy is present.     Hyperdynamic LV systolic function.     Estimated ejection fraction is 70-75%.     No regional wall motion abnormalities.     Mild (grade 1) diastolic dysfunction.         Right Atrium     Normal appearing right atrium.         Right Ventricle     Normal appearing right ventricle structure and function.         Pericardial Effusion     No evidence of pericardial effusion.         Pleural Effusion     Pleural effusion cannot be ruled out.         Miscellaneous     The IVC appears normal.         Impression         Summary         Mild concentric left ventricular hypertrophy is present.     Hyperdynamic LV systolic function.     Estimated ejection fraction is 70-75%.     No regional wall motion abnormalities.     The left atrium is mildly dilated.     Mild aortic stenosis.     Mild (1+) aortic regurgitation     Trace to mild mitral regurgitation

## 2020-03-09 PROBLEM — Z00.00 ENCOUNTER FOR PREVENTIVE HEALTH EXAMINATION: Noted: 2020-03-09

## 2020-03-12 DIAGNOSIS — J44.1 CHRONIC OBSTRUCTIVE PULMONARY DISEASE WITH (ACUTE) EXACERBATION: ICD-10-CM

## 2020-03-12 DIAGNOSIS — J96.01 ACUTE RESPIRATORY FAILURE WITH HYPOXIA: ICD-10-CM

## 2020-03-12 DIAGNOSIS — E78.5 HYPERLIPIDEMIA, UNSPECIFIED: ICD-10-CM

## 2020-03-12 DIAGNOSIS — B34.2 CORONAVIRUS INFECTION, UNSPECIFIED: ICD-10-CM

## 2020-03-12 DIAGNOSIS — J12.89 OTHER VIRAL PNEUMONIA: ICD-10-CM

## 2020-03-12 DIAGNOSIS — K21.9 GASTRO-ESOPHAGEAL REFLUX DISEASE WITHOUT ESOPHAGITIS: ICD-10-CM

## 2020-03-12 DIAGNOSIS — N39.0 URINARY TRACT INFECTION, SITE NOT SPECIFIED: ICD-10-CM

## 2020-03-12 DIAGNOSIS — I10 ESSENTIAL (PRIMARY) HYPERTENSION: ICD-10-CM

## 2020-03-12 DIAGNOSIS — E03.9 HYPOTHYROIDISM, UNSPECIFIED: ICD-10-CM

## 2020-03-12 DIAGNOSIS — J18.9 PNEUMONIA, UNSPECIFIED ORGANISM: ICD-10-CM

## 2020-03-12 DIAGNOSIS — F41.9 ANXIETY DISORDER, UNSPECIFIED: ICD-10-CM

## 2020-03-12 DIAGNOSIS — F03.90 UNSPECIFIED DEMENTIA, UNSPECIFIED SEVERITY, WITHOUT BEHAVIORAL DISTURBANCE, PSYCHOTIC DISTURBANCE, MOOD DISTURBANCE, AND ANXIETY: ICD-10-CM

## 2022-03-01 NOTE — ED PROVIDER NOTE - RECENT EXPOSURE TO
You might receive a short survey about today's visit. The Eye department team appreciates your feedback. Thank you    
none known

## 2022-08-13 RX ORDER — LORATADINE 10 MG/1
1 TABLET ORAL
Qty: 0 | Refills: 0 | DISCHARGE

## 2022-08-13 RX ORDER — ACETAMINOPHEN 500 MG
2 TABLET ORAL
Qty: 0 | Refills: 0 | DISCHARGE

## 2022-08-13 RX ORDER — DONEPEZIL HYDROCHLORIDE 10 MG/1
1 TABLET, FILM COATED ORAL
Qty: 0 | Refills: 0 | DISCHARGE

## 2022-08-13 RX ORDER — DOCUSATE SODIUM 100 MG
2 CAPSULE ORAL
Qty: 0 | Refills: 0 | DISCHARGE

## 2022-08-13 RX ORDER — MEMANTINE HYDROCHLORIDE 10 MG/1
1 TABLET ORAL
Qty: 0 | Refills: 0 | DISCHARGE

## 2022-08-13 RX ORDER — ALBUTEROL 90 UG/1
2 AEROSOL, METERED ORAL
Qty: 0 | Refills: 0 | DISCHARGE

## 2022-08-13 RX ORDER — VERAPAMIL HCL 240 MG
1 CAPSULE, EXTENDED RELEASE PELLETS 24 HR ORAL
Qty: 0 | Refills: 0 | DISCHARGE

## 2022-08-13 RX ORDER — SIMVASTATIN 20 MG/1
1 TABLET, FILM COATED ORAL
Qty: 0 | Refills: 0 | DISCHARGE

## 2022-08-13 RX ORDER — LEVOTHYROXINE SODIUM 125 MCG
1 TABLET ORAL
Qty: 0 | Refills: 0 | DISCHARGE

## 2022-08-13 RX ORDER — LACTULOSE 10 G/15ML
15 SOLUTION ORAL
Qty: 0 | Refills: 0 | DISCHARGE

## 2022-08-13 RX ORDER — FLUTICASONE PROPIONATE 50 MCG
1 SPRAY, SUSPENSION NASAL
Qty: 0 | Refills: 0 | DISCHARGE

## 2022-08-13 RX ORDER — ALBUTEROL 90 UG/1
3 AEROSOL, METERED ORAL
Qty: 0 | Refills: 0 | DISCHARGE

## 2022-08-13 RX ORDER — LOSARTAN POTASSIUM 100 MG/1
1 TABLET, FILM COATED ORAL
Qty: 0 | Refills: 0 | DISCHARGE

## 2022-08-13 RX ORDER — QUETIAPINE FUMARATE 200 MG/1
0.5 TABLET, FILM COATED ORAL
Qty: 0 | Refills: 0 | DISCHARGE

## 2022-08-13 RX ORDER — ALPRAZOLAM 0.25 MG
1 TABLET ORAL
Qty: 0 | Refills: 0 | DISCHARGE

## 2022-08-13 RX ORDER — FAMOTIDINE 10 MG/ML
1 INJECTION INTRAVENOUS
Qty: 0 | Refills: 0 | DISCHARGE

## 2022-08-13 RX ORDER — CHOLECALCIFEROL (VITAMIN D3) 125 MCG
1 CAPSULE ORAL
Qty: 0 | Refills: 0 | DISCHARGE

## 2022-08-13 RX ORDER — RANITIDINE HYDROCHLORIDE 150 MG/1
1 TABLET, FILM COATED ORAL
Qty: 0 | Refills: 0 | DISCHARGE

## 2022-08-13 RX ORDER — SENNA PLUS 8.6 MG/1
2 TABLET ORAL
Qty: 0 | Refills: 0 | DISCHARGE

## 2022-08-13 RX ORDER — MULTIVIT-MIN/FERROUS GLUCONATE 9 MG/15 ML
1 LIQUID (ML) ORAL
Qty: 0 | Refills: 0 | DISCHARGE

## 2025-06-16 NOTE — H&P ADULT - NSICDXPASTSURGICALHX_GEN_ALL_CORE_FT
No signed consent on file for REBEKAH from [facility] at home on chart to discuss patient's healthcare.   PAST SURGICAL HISTORY:  History of left knee replacement